# Patient Record
Sex: MALE | Race: WHITE | NOT HISPANIC OR LATINO | Employment: OTHER | URBAN - METROPOLITAN AREA
[De-identification: names, ages, dates, MRNs, and addresses within clinical notes are randomized per-mention and may not be internally consistent; named-entity substitution may affect disease eponyms.]

---

## 2021-12-17 ENCOUNTER — HOSPITAL ENCOUNTER (OUTPATIENT)
Facility: MEDICAL CENTER | Age: 61
End: 2021-12-17
Attending: NURSE PRACTITIONER
Payer: COMMERCIAL

## 2021-12-17 ENCOUNTER — OFFICE VISIT (OUTPATIENT)
Dept: URGENT CARE | Facility: CLINIC | Age: 61
End: 2021-12-17
Payer: COMMERCIAL

## 2021-12-17 VITALS
DIASTOLIC BLOOD PRESSURE: 78 MMHG | RESPIRATION RATE: 18 BRPM | OXYGEN SATURATION: 97 % | HEART RATE: 74 BPM | HEIGHT: 70 IN | SYSTOLIC BLOOD PRESSURE: 130 MMHG | TEMPERATURE: 98.7 F | BODY MASS INDEX: 22.9 KG/M2 | WEIGHT: 160 LBS

## 2021-12-17 DIAGNOSIS — R05.9 COUGH: ICD-10-CM

## 2021-12-17 DIAGNOSIS — J22 ACUTE LOWER RESPIRATORY TRACT INFECTION: ICD-10-CM

## 2021-12-17 PROCEDURE — U0005 INFEC AGEN DETEC AMPLI PROBE: HCPCS

## 2021-12-17 PROCEDURE — 99203 OFFICE O/P NEW LOW 30 MIN: CPT | Performed by: NURSE PRACTITIONER

## 2021-12-17 PROCEDURE — U0003 INFECTIOUS AGENT DETECTION BY NUCLEIC ACID (DNA OR RNA); SEVERE ACUTE RESPIRATORY SYNDROME CORONAVIRUS 2 (SARS-COV-2) (CORONAVIRUS DISEASE [COVID-19]), AMPLIFIED PROBE TECHNIQUE, MAKING USE OF HIGH THROUGHPUT TECHNOLOGIES AS DESCRIBED BY CMS-2020-01-R: HCPCS

## 2021-12-17 RX ORDER — LOSARTAN POTASSIUM 50 MG/1
1 TABLET ORAL
COMMUNITY
Start: 2021-11-23 | End: 2023-07-03

## 2021-12-17 RX ORDER — DOXYCYCLINE HYCLATE 100 MG/1
100 CAPSULE ORAL 2 TIMES DAILY
Qty: 20 CAPSULE | Refills: 0 | Status: SHIPPED | OUTPATIENT
Start: 2021-12-17 | End: 2021-12-27

## 2021-12-17 RX ORDER — LEVOTHYROXINE SODIUM 0.12 MG/1
TABLET ORAL
COMMUNITY
Start: 2021-11-08

## 2021-12-17 RX ORDER — FAMOTIDINE 20 MG/1
20 TABLET, FILM COATED ORAL
COMMUNITY

## 2021-12-17 RX ORDER — ATORVASTATIN CALCIUM 10 MG/1
TABLET, FILM COATED ORAL
COMMUNITY
Start: 2021-10-08 | End: 2021-12-17

## 2021-12-17 RX ORDER — ALBUTEROL SULFATE 90 UG/1
2 AEROSOL, METERED RESPIRATORY (INHALATION)
COMMUNITY

## 2021-12-17 ASSESSMENT — LIFESTYLE VARIABLES: SUBSTANCE_ABUSE: 0

## 2021-12-17 ASSESSMENT — ENCOUNTER SYMPTOMS
EYE REDNESS: 0
FEVER: 0
EYE DISCHARGE: 0
HEADACHES: 0
COUGH: 0
CHILLS: 0
NAUSEA: 0

## 2021-12-17 NOTE — PROGRESS NOTES
Luis Wilson is a 61 y.o. male who presents for Cough (x 5 days, thick phem, productive cough, headache.)      HPI this new problem.  Luis is a 61-year-old male patient presents with cough for 5 days.  He has been coughing up thick yellow phlegm.  Feels like the infection is deep in his lungs.  He denies fever or chills.  He has felt fatigued.  He has associated headache.  His son just came home from college for winter break and was ill.  His son was recently seen in urgent care and also treated for a respiratory infection.  Luis has been intermittently wheezing.  He takes his normal asthma medication.  He does not feel that this is like his usual asthma flareup.  No other aggravating alleviating factors.    Review of Systems   Constitutional: Negative for chills and fever.   Eyes: Negative for discharge and redness.   Respiratory: Negative for cough.    Cardiovascular: Negative for chest pain.   Gastrointestinal: Negative for nausea.   Neurological: Negative for headaches.   Endo/Heme/Allergies: Negative for environmental allergies.   Psychiatric/Behavioral: Negative for substance abuse.       Allergies:     No Known Allergies    PMSFS Hx:  History reviewed. No pertinent past medical history.  History reviewed. No pertinent surgical history.  History reviewed. No pertinent family history.  Social History     Tobacco Use   • Smoking status: Never Smoker   • Smokeless tobacco: Never Used   Substance Use Topics   • Alcohol use: Yes     Alcohol/week: 7.2 oz     Types: 12 Glasses of wine per week       Problems:   There is no problem list on file for this patient.      Medications:   Current Outpatient Medications on File Prior to Visit   Medication Sig Dispense Refill   • albuterol 108 (90 Base) MCG/ACT Aero Soln inhalation aerosol Inhale 2 Puffs.     • losartan (COZAAR) 50 MG Tab Take 1 Tablet by mouth every day.     • WIXELA INHUB 100-50 MCG/DOSE AEROSOL POWDER, BREATH ACTIVATED      • levothyroxine  "(SYNTHROID) 125 MCG Tab      • famotidine (PEPCID) 20 MG Tab Take 20 mg by mouth.       No current facility-administered medications on file prior to visit.          Objective:     /78   Pulse 74   Temp 37.1 °C (98.7 °F) (Temporal)   Resp 18   Ht 1.778 m (5' 10\")   Wt 72.6 kg (160 lb)   SpO2 97%   BMI 22.96 kg/m²     Physical Exam  Vitals reviewed.   Constitutional:       General: He is not in acute distress.     Appearance: Normal appearance. He is well-developed, well-groomed and normal weight. He is not ill-appearing or toxic-appearing.   HENT:      Head: Normocephalic.      Mouth/Throat:      Mouth: Mucous membranes are moist.   Cardiovascular:      Rate and Rhythm: Normal rate and regular rhythm.      Pulses: Normal pulses.      Heart sounds: Normal heart sounds.   Pulmonary:      Effort: Pulmonary effort is normal. No accessory muscle usage.      Breath sounds: No decreased air movement. Rhonchi present.   Skin:     General: Skin is warm.      Capillary Refill: Capillary refill takes less than 2 seconds.   Neurological:      Mental Status: He is alert and oriented to person, place, and time.   Psychiatric:         Mood and Affect: Mood normal.         Behavior: Behavior normal. Behavior is cooperative.         Thought Content: Thought content normal.         Assessment /Associated Orders:      1. Acute lower respiratory tract infection  doxycycline (VIBRAMYCIN) 100 MG Cap   2. Cough  SARS-CoV-2 PCR (24 hour In-House): Collect [facility] swab in [facility]       Medical Decision Making:    Pt is clinically stable at today's acute urgent care visit.  No acute distress noted. Appropriate for outpatient management at this time.   Acute problem today with uncertain prognosis.   Educated in proper administration of medication(s) ordered today including safety, possible SE, risks, benefits, rationale and alternatives to therapy.   Keep well hydrated  covid pcr- pending   Self quarantine per CDC guidelines. "         Advised to follow-up with the primary care provider for recheck, reevaluation, and consideration of further management if necessary.   Discussed management options (risks,benefits, and alternatives to treatment). Expressed understanding and the treatment plan was agreed upon. Questions were encouraged and answered   Return to urgent care prn if new or worsening sx or if there is no improvement in condition prn.    Educated in Red flags and indications to immediately call 911 or present to the Emergency Department.     I personally reviewed prior external notes and test results pertinent to today's visit.  I have independently reviewed and interpreted all diagnostics ordered during this urgent care acute visit.   Time spent evaluating this patient was at least 30 minutes and includes preparing for visit, counseling/education, exam and evaluation, obtaining history, independent interpretation, ordering lab/test/procedures,medication management and documentation.Time does not include separately billable procedures noted .

## 2021-12-18 DIAGNOSIS — R05.9 COUGH: ICD-10-CM

## 2021-12-18 LAB
COVID ORDER STATUS COVID19: NORMAL
SARS-COV-2 RNA RESP QL NAA+PROBE: NOTDETECTED
SPECIMEN SOURCE: NORMAL

## 2021-12-18 RX ORDER — BENZONATATE 200 MG/1
200 CAPSULE ORAL EVERY 8 HOURS PRN
Qty: 30 CAPSULE | Refills: 0 | Status: SHIPPED | OUTPATIENT
Start: 2021-12-18 | End: 2023-07-03

## 2021-12-18 NOTE — PROGRESS NOTES
Patient seen in urgent care yesterday.  Started on doxycycline for lower respiratory tract infection.  Patient comes in today stating that he had the impression cough medication would be sent.  Reports he already has an albuterol inhaler.  Prescription sent for Tessalon Perles to his pharmacy.

## 2023-05-23 ENCOUNTER — APPOINTMENT (OUTPATIENT)
Dept: ADMISSIONS | Facility: MEDICAL CENTER | Age: 63
End: 2023-05-23
Attending: UROLOGY
Payer: COMMERCIAL

## 2023-07-03 ENCOUNTER — PRE-ADMISSION TESTING (OUTPATIENT)
Dept: ADMISSIONS | Facility: MEDICAL CENTER | Age: 63
End: 2023-07-03
Attending: UROLOGY
Payer: COMMERCIAL

## 2023-07-03 VITALS — BODY MASS INDEX: 22.96 KG/M2 | HEIGHT: 70 IN

## 2023-07-03 RX ORDER — OLOPATADINE HYDROCHLORIDE 2 MG/ML
SOLUTION/ DROPS OPHTHALMIC
COMMUNITY
End: 2023-07-03

## 2023-07-03 RX ORDER — FLUTICASONE PROPIONATE AND SALMETEROL 250; 50 UG/1; UG/1
POWDER RESPIRATORY (INHALATION)
COMMUNITY
Start: 2023-06-06

## 2023-07-03 RX ORDER — AZELASTINE HYDROCHLORIDE 0.5 MG/ML
SOLUTION/ DROPS OPHTHALMIC
COMMUNITY
End: 2023-07-03

## 2023-07-03 RX ORDER — ATORVASTATIN CALCIUM 10 MG/1
10 TABLET, FILM COATED ORAL
COMMUNITY
Start: 2023-04-11

## 2023-07-03 RX ORDER — EPINEPHRINE 0.3 MG/.3ML
INJECTION SUBCUTANEOUS
COMMUNITY

## 2023-07-03 RX ORDER — MONTELUKAST SODIUM 10 MG/1
10 TABLET ORAL
COMMUNITY
Start: 2023-06-01

## 2023-07-03 RX ORDER — LOSARTAN POTASSIUM 100 MG/1
100 TABLET ORAL
COMMUNITY
Start: 2023-06-02

## 2023-07-03 NOTE — PREPROCEDURE INSTRUCTIONS
" Reviewed highlights of \"Preparing for your procedure\" over the phone. Sent email from pre-admit with \"Preparing for your Procedure\", \"fasting\", \"Pain management\", \"Patient Safety\", \"Speak-up\" handouts and map. Agreed with surgical site listed on consent.  Instructed to take respiratory medications, lipitor, thyroid meds DOS. Can take tylenol, pepcid if needed. Instructed to hold losartan for 24 hours.   "

## 2023-07-05 ENCOUNTER — APPOINTMENT (OUTPATIENT)
Dept: ADMISSIONS | Facility: MEDICAL CENTER | Age: 63
End: 2023-07-05
Attending: UROLOGY
Payer: COMMERCIAL

## 2023-07-05 DIAGNOSIS — Z01.810 PRE-OPERATIVE CARDIOVASCULAR EXAMINATION: ICD-10-CM

## 2023-07-05 LAB — EKG IMPRESSION: NORMAL

## 2023-07-05 PROCEDURE — 93010 ELECTROCARDIOGRAM REPORT: CPT | Performed by: INTERNAL MEDICINE

## 2023-07-05 PROCEDURE — 93005 ELECTROCARDIOGRAM TRACING: CPT

## 2023-07-13 ENCOUNTER — ANESTHESIA EVENT (OUTPATIENT)
Dept: SURGERY | Facility: MEDICAL CENTER | Age: 63
End: 2023-07-13
Payer: COMMERCIAL

## 2023-07-13 ENCOUNTER — HOSPITAL ENCOUNTER (OUTPATIENT)
Facility: MEDICAL CENTER | Age: 63
End: 2023-07-14
Attending: UROLOGY | Admitting: UROLOGY
Payer: COMMERCIAL

## 2023-07-13 ENCOUNTER — ANESTHESIA (OUTPATIENT)
Dept: SURGERY | Facility: MEDICAL CENTER | Age: 63
End: 2023-07-13
Payer: COMMERCIAL

## 2023-07-13 DIAGNOSIS — G89.18 POST-OP PAIN: ICD-10-CM

## 2023-07-13 PROBLEM — C61 PROSTATE CANCER (HCC): Status: ACTIVE | Noted: 2023-07-13

## 2023-07-13 LAB — PATHOLOGY CONSULT NOTE: NORMAL

## 2023-07-13 PROCEDURE — 160035 HCHG PACU - 1ST 60 MINS PHASE I: Performed by: UROLOGY

## 2023-07-13 PROCEDURE — 88305 TISSUE EXAM BY PATHOLOGIST: CPT | Mod: 59

## 2023-07-13 PROCEDURE — 502714 HCHG ROBOTIC SURGERY SERVICES: Performed by: UROLOGY

## 2023-07-13 PROCEDURE — 700101 HCHG RX REV CODE 250: Performed by: ANESTHESIOLOGY

## 2023-07-13 PROCEDURE — 700102 HCHG RX REV CODE 250 W/ 637 OVERRIDE(OP): Performed by: PHYSICIAN ASSISTANT

## 2023-07-13 PROCEDURE — 160048 HCHG OR STATISTICAL LEVEL 1-5: Performed by: UROLOGY

## 2023-07-13 PROCEDURE — A9270 NON-COVERED ITEM OR SERVICE: HCPCS | Performed by: PHYSICIAN ASSISTANT

## 2023-07-13 PROCEDURE — 00865 ANES XTRPRT LWR ABD PRST8ECT: CPT | Performed by: ANESTHESIOLOGY

## 2023-07-13 PROCEDURE — 160031 HCHG SURGERY MINUTES - 1ST 30 MINS LEVEL 5: Performed by: UROLOGY

## 2023-07-13 PROCEDURE — 700105 HCHG RX REV CODE 258: Mod: JZ | Performed by: UROLOGY

## 2023-07-13 PROCEDURE — 160042 HCHG SURGERY MINUTES - EA ADDL 1 MIN LEVEL 5: Performed by: UROLOGY

## 2023-07-13 PROCEDURE — 94760 N-INVAS EAR/PLS OXIMETRY 1: CPT

## 2023-07-13 PROCEDURE — 700111 HCHG RX REV CODE 636 W/ 250 OVERRIDE (IP): Performed by: ANESTHESIOLOGY

## 2023-07-13 PROCEDURE — 160009 HCHG ANES TIME/MIN: Performed by: UROLOGY

## 2023-07-13 PROCEDURE — 700101 HCHG RX REV CODE 250: Performed by: PHYSICIAN ASSISTANT

## 2023-07-13 PROCEDURE — 700101 HCHG RX REV CODE 250: Performed by: UROLOGY

## 2023-07-13 PROCEDURE — A9270 NON-COVERED ITEM OR SERVICE: HCPCS | Performed by: ANESTHESIOLOGY

## 2023-07-13 PROCEDURE — 160036 HCHG PACU - EA ADDL 30 MINS PHASE I: Performed by: UROLOGY

## 2023-07-13 PROCEDURE — 88309 TISSUE EXAM BY PATHOLOGIST: CPT

## 2023-07-13 PROCEDURE — G0378 HOSPITAL OBSERVATION PER HR: HCPCS

## 2023-07-13 PROCEDURE — 160002 HCHG RECOVERY MINUTES (STAT): Performed by: UROLOGY

## 2023-07-13 PROCEDURE — 700102 HCHG RX REV CODE 250 W/ 637 OVERRIDE(OP): Performed by: ANESTHESIOLOGY

## 2023-07-13 RX ORDER — FAMOTIDINE 20 MG/1
20 TABLET, FILM COATED ORAL DAILY
Status: DISCONTINUED | OUTPATIENT
Start: 2023-07-14 | End: 2023-07-14 | Stop reason: HOSPADM

## 2023-07-13 RX ORDER — DOCUSATE SODIUM 100 MG/1
100 CAPSULE, LIQUID FILLED ORAL 2 TIMES DAILY
Status: DISCONTINUED | OUTPATIENT
Start: 2023-07-13 | End: 2023-07-14 | Stop reason: HOSPADM

## 2023-07-13 RX ORDER — DEXAMETHASONE SODIUM PHOSPHATE 4 MG/ML
INJECTION, SOLUTION INTRA-ARTICULAR; INTRALESIONAL; INTRAMUSCULAR; INTRAVENOUS; SOFT TISSUE PRN
Status: DISCONTINUED | OUTPATIENT
Start: 2023-07-13 | End: 2023-07-13 | Stop reason: SURG

## 2023-07-13 RX ORDER — MEPERIDINE HYDROCHLORIDE 25 MG/ML
6.25 INJECTION INTRAMUSCULAR; INTRAVENOUS; SUBCUTANEOUS
Status: DISCONTINUED | OUTPATIENT
Start: 2023-07-13 | End: 2023-07-13 | Stop reason: HOSPADM

## 2023-07-13 RX ORDER — ACETAMINOPHEN 500 MG
1000 TABLET ORAL ONCE
Status: COMPLETED | OUTPATIENT
Start: 2023-07-13 | End: 2023-07-13

## 2023-07-13 RX ORDER — HALOPERIDOL 5 MG/ML
1 INJECTION INTRAMUSCULAR
Status: DISCONTINUED | OUTPATIENT
Start: 2023-07-13 | End: 2023-07-13 | Stop reason: HOSPADM

## 2023-07-13 RX ORDER — ACETAMINOPHEN 500 MG
1000 TABLET ORAL EVERY 6 HOURS
Status: DISCONTINUED | OUTPATIENT
Start: 2023-07-13 | End: 2023-07-14 | Stop reason: HOSPADM

## 2023-07-13 RX ORDER — SODIUM CHLORIDE, SODIUM LACTATE, POTASSIUM CHLORIDE, CALCIUM CHLORIDE 600; 310; 30; 20 MG/100ML; MG/100ML; MG/100ML; MG/100ML
INJECTION, SOLUTION INTRAVENOUS CONTINUOUS
Status: DISCONTINUED | OUTPATIENT
Start: 2023-07-13 | End: 2023-07-13 | Stop reason: HOSPADM

## 2023-07-13 RX ORDER — HYDRALAZINE HYDROCHLORIDE 20 MG/ML
5 INJECTION INTRAMUSCULAR; INTRAVENOUS
Status: DISCONTINUED | OUTPATIENT
Start: 2023-07-13 | End: 2023-07-13 | Stop reason: HOSPADM

## 2023-07-13 RX ORDER — OXYCODONE HYDROCHLORIDE AND ACETAMINOPHEN 5; 325 MG/1; MG/1
1 TABLET ORAL EVERY 4 HOURS PRN
Qty: 20 TABLET | Refills: 0 | Status: SHIPPED | OUTPATIENT
Start: 2023-07-13 | End: 2023-07-20

## 2023-07-13 RX ORDER — LEVOTHYROXINE SODIUM 0.12 MG/1
125 TABLET ORAL
Status: DISCONTINUED | OUTPATIENT
Start: 2023-07-14 | End: 2023-07-14 | Stop reason: HOSPADM

## 2023-07-13 RX ORDER — HYDROMORPHONE HYDROCHLORIDE 1 MG/ML
0.1 INJECTION, SOLUTION INTRAMUSCULAR; INTRAVENOUS; SUBCUTANEOUS
Status: DISCONTINUED | OUTPATIENT
Start: 2023-07-13 | End: 2023-07-13 | Stop reason: HOSPADM

## 2023-07-13 RX ORDER — BUPIVACAINE HYDROCHLORIDE AND EPINEPHRINE 5; 5 MG/ML; UG/ML
INJECTION, SOLUTION EPIDURAL; INTRACAUDAL; PERINEURAL
Status: DISCONTINUED | OUTPATIENT
Start: 2023-07-13 | End: 2023-07-13 | Stop reason: HOSPADM

## 2023-07-13 RX ORDER — ACETAMINOPHEN 500 MG
1000 TABLET ORAL EVERY 6 HOURS PRN
Status: DISCONTINUED | OUTPATIENT
Start: 2023-07-18 | End: 2023-07-14 | Stop reason: HOSPADM

## 2023-07-13 RX ORDER — MIDAZOLAM HYDROCHLORIDE 1 MG/ML
INJECTION INTRAMUSCULAR; INTRAVENOUS PRN
Status: DISCONTINUED | OUTPATIENT
Start: 2023-07-13 | End: 2023-07-13 | Stop reason: SURG

## 2023-07-13 RX ORDER — DEXTROSE MONOHYDRATE, SODIUM CHLORIDE, AND POTASSIUM CHLORIDE 50; 1.49; 4.5 G/1000ML; G/1000ML; G/1000ML
INJECTION, SOLUTION INTRAVENOUS CONTINUOUS
Status: DISPENSED | OUTPATIENT
Start: 2023-07-13 | End: 2023-07-13

## 2023-07-13 RX ORDER — METOCLOPRAMIDE HYDROCHLORIDE 5 MG/ML
INJECTION INTRAMUSCULAR; INTRAVENOUS PRN
Status: DISCONTINUED | OUTPATIENT
Start: 2023-07-13 | End: 2023-07-13 | Stop reason: SURG

## 2023-07-13 RX ORDER — ENOXAPARIN SODIUM 100 MG/ML
40 INJECTION SUBCUTANEOUS DAILY
Status: DISCONTINUED | OUTPATIENT
Start: 2023-07-14 | End: 2023-07-14 | Stop reason: HOSPADM

## 2023-07-13 RX ORDER — OXYCODONE HCL 5 MG/5 ML
5 SOLUTION, ORAL ORAL
Status: DISCONTINUED | OUTPATIENT
Start: 2023-07-13 | End: 2023-07-13 | Stop reason: HOSPADM

## 2023-07-13 RX ORDER — ONDANSETRON 2 MG/ML
4 INJECTION INTRAMUSCULAR; INTRAVENOUS EVERY 4 HOURS PRN
Status: DISCONTINUED | OUTPATIENT
Start: 2023-07-13 | End: 2023-07-14 | Stop reason: HOSPADM

## 2023-07-13 RX ORDER — OXYCODONE HYDROCHLORIDE 10 MG/1
10 TABLET ORAL
Status: DISCONTINUED | OUTPATIENT
Start: 2023-07-13 | End: 2023-07-14 | Stop reason: HOSPADM

## 2023-07-13 RX ORDER — LIDOCAINE HYDROCHLORIDE 20 MG/ML
INJECTION, SOLUTION EPIDURAL; INFILTRATION; INTRACAUDAL; PERINEURAL PRN
Status: DISCONTINUED | OUTPATIENT
Start: 2023-07-13 | End: 2023-07-13 | Stop reason: SURG

## 2023-07-13 RX ORDER — HYDROMORPHONE HYDROCHLORIDE 1 MG/ML
0.4 INJECTION, SOLUTION INTRAMUSCULAR; INTRAVENOUS; SUBCUTANEOUS
Status: DISCONTINUED | OUTPATIENT
Start: 2023-07-13 | End: 2023-07-13 | Stop reason: HOSPADM

## 2023-07-13 RX ORDER — DIPHENHYDRAMINE HYDROCHLORIDE 50 MG/ML
12.5 INJECTION INTRAMUSCULAR; INTRAVENOUS
Status: DISCONTINUED | OUTPATIENT
Start: 2023-07-13 | End: 2023-07-13 | Stop reason: HOSPADM

## 2023-07-13 RX ORDER — DIPHENHYDRAMINE HYDROCHLORIDE 50 MG/ML
25 INJECTION INTRAMUSCULAR; INTRAVENOUS EVERY 6 HOURS PRN
Status: DISCONTINUED | OUTPATIENT
Start: 2023-07-13 | End: 2023-07-14 | Stop reason: HOSPADM

## 2023-07-13 RX ORDER — LOSARTAN POTASSIUM 25 MG/1
100 TABLET ORAL DAILY
Status: DISCONTINUED | OUTPATIENT
Start: 2023-07-14 | End: 2023-07-13

## 2023-07-13 RX ORDER — MONTELUKAST SODIUM 10 MG/1
10 TABLET ORAL
Status: DISCONTINUED | OUTPATIENT
Start: 2023-07-13 | End: 2023-07-14 | Stop reason: HOSPADM

## 2023-07-13 RX ORDER — LOSARTAN POTASSIUM 50 MG/1
100 TABLET ORAL DAILY
Status: DISCONTINUED | OUTPATIENT
Start: 2023-07-14 | End: 2023-07-14 | Stop reason: HOSPADM

## 2023-07-13 RX ORDER — ROCURONIUM BROMIDE 10 MG/ML
INJECTION, SOLUTION INTRAVENOUS PRN
Status: DISCONTINUED | OUTPATIENT
Start: 2023-07-13 | End: 2023-07-13 | Stop reason: SURG

## 2023-07-13 RX ORDER — HYDROMORPHONE HYDROCHLORIDE 2 MG/ML
INJECTION, SOLUTION INTRAMUSCULAR; INTRAVENOUS; SUBCUTANEOUS PRN
Status: DISCONTINUED | OUTPATIENT
Start: 2023-07-13 | End: 2023-07-13 | Stop reason: SURG

## 2023-07-13 RX ORDER — HYDROMORPHONE HYDROCHLORIDE 1 MG/ML
0.5 INJECTION, SOLUTION INTRAMUSCULAR; INTRAVENOUS; SUBCUTANEOUS
Status: DISCONTINUED | OUTPATIENT
Start: 2023-07-13 | End: 2023-07-14 | Stop reason: HOSPADM

## 2023-07-13 RX ORDER — OXYCODONE HCL 5 MG/5 ML
10 SOLUTION, ORAL ORAL
Status: DISCONTINUED | OUTPATIENT
Start: 2023-07-13 | End: 2023-07-13 | Stop reason: HOSPADM

## 2023-07-13 RX ORDER — OXYCODONE HYDROCHLORIDE 5 MG/1
5 TABLET ORAL
Status: DISCONTINUED | OUTPATIENT
Start: 2023-07-13 | End: 2023-07-14 | Stop reason: HOSPADM

## 2023-07-13 RX ORDER — CEFAZOLIN SODIUM 1 G/3ML
INJECTION, POWDER, FOR SOLUTION INTRAMUSCULAR; INTRAVENOUS PRN
Status: DISCONTINUED | OUTPATIENT
Start: 2023-07-13 | End: 2023-07-13 | Stop reason: SURG

## 2023-07-13 RX ORDER — ONDANSETRON 2 MG/ML
4 INJECTION INTRAMUSCULAR; INTRAVENOUS
Status: DISCONTINUED | OUTPATIENT
Start: 2023-07-13 | End: 2023-07-13 | Stop reason: HOSPADM

## 2023-07-13 RX ORDER — GABAPENTIN 300 MG/1
300 CAPSULE ORAL ONCE
Status: COMPLETED | OUTPATIENT
Start: 2023-07-13 | End: 2023-07-13

## 2023-07-13 RX ORDER — EPHEDRINE SULFATE 50 MG/ML
5 INJECTION, SOLUTION INTRAVENOUS
Status: DISCONTINUED | OUTPATIENT
Start: 2023-07-13 | End: 2023-07-13 | Stop reason: HOSPADM

## 2023-07-13 RX ORDER — ATORVASTATIN CALCIUM 10 MG/1
10 TABLET, FILM COATED ORAL DAILY
Status: DISCONTINUED | OUTPATIENT
Start: 2023-07-14 | End: 2023-07-14 | Stop reason: HOSPADM

## 2023-07-13 RX ORDER — SODIUM CHLORIDE, SODIUM LACTATE, POTASSIUM CHLORIDE, CALCIUM CHLORIDE 600; 310; 30; 20 MG/100ML; MG/100ML; MG/100ML; MG/100ML
INJECTION, SOLUTION INTRAVENOUS CONTINUOUS
Status: ACTIVE | OUTPATIENT
Start: 2023-07-13 | End: 2023-07-13

## 2023-07-13 RX ORDER — HYDROMORPHONE HYDROCHLORIDE 1 MG/ML
0.2 INJECTION, SOLUTION INTRAMUSCULAR; INTRAVENOUS; SUBCUTANEOUS
Status: DISCONTINUED | OUTPATIENT
Start: 2023-07-13 | End: 2023-07-13 | Stop reason: HOSPADM

## 2023-07-13 RX ADMIN — ROCURONIUM BROMIDE 10 MG: 50 INJECTION, SOLUTION INTRAVENOUS at 10:41

## 2023-07-13 RX ADMIN — HYDROMORPHONE HYDROCHLORIDE 0.3 MG: 2 INJECTION INTRAMUSCULAR; INTRAVENOUS; SUBCUTANEOUS at 09:26

## 2023-07-13 RX ADMIN — HYDROMORPHONE HYDROCHLORIDE 0.3 MG: 2 INJECTION INTRAMUSCULAR; INTRAVENOUS; SUBCUTANEOUS at 10:55

## 2023-07-13 RX ADMIN — DOCUSATE SODIUM 100 MG: 100 CAPSULE, LIQUID FILLED ORAL at 17:28

## 2023-07-13 RX ADMIN — SODIUM CHLORIDE, POTASSIUM CHLORIDE, SODIUM LACTATE AND CALCIUM CHLORIDE: 600; 310; 30; 20 INJECTION, SOLUTION INTRAVENOUS at 06:46

## 2023-07-13 RX ADMIN — DEXAMETHASONE SODIUM PHOSPHATE 8 MG: 4 INJECTION INTRA-ARTICULAR; INTRALESIONAL; INTRAMUSCULAR; INTRAVENOUS; SOFT TISSUE at 10:18

## 2023-07-13 RX ADMIN — ROCURONIUM BROMIDE 40 MG: 50 INJECTION, SOLUTION INTRAVENOUS at 07:38

## 2023-07-13 RX ADMIN — MIDAZOLAM 2 MG: 1 INJECTION, SOLUTION INTRAMUSCULAR; INTRAVENOUS at 07:36

## 2023-07-13 RX ADMIN — OXYCODONE 5 MG: 5 TABLET ORAL at 16:09

## 2023-07-13 RX ADMIN — FENTANYL CITRATE 50 MCG: 50 INJECTION, SOLUTION INTRAMUSCULAR; INTRAVENOUS at 08:06

## 2023-07-13 RX ADMIN — ROCURONIUM BROMIDE 10 MG: 50 INJECTION, SOLUTION INTRAVENOUS at 08:52

## 2023-07-13 RX ADMIN — CEFAZOLIN 2 G: 1 INJECTION, POWDER, FOR SOLUTION INTRAMUSCULAR; INTRAVENOUS at 11:49

## 2023-07-13 RX ADMIN — CEFAZOLIN 2 G: 1 INJECTION, POWDER, FOR SOLUTION INTRAMUSCULAR; INTRAVENOUS at 07:58

## 2023-07-13 RX ADMIN — GABAPENTIN 300 MG: 300 CAPSULE ORAL at 06:40

## 2023-07-13 RX ADMIN — PROPOFOL 10 MG: 10 INJECTION, EMULSION INTRAVENOUS at 11:42

## 2023-07-13 RX ADMIN — POTASSIUM CHLORIDE, DEXTROSE MONOHYDRATE AND SODIUM CHLORIDE: 150; 5; 450 INJECTION, SOLUTION INTRAVENOUS at 16:09

## 2023-07-13 RX ADMIN — FENTANYL CITRATE 50 MCG: 50 INJECTION, SOLUTION INTRAMUSCULAR; INTRAVENOUS at 09:38

## 2023-07-13 RX ADMIN — SODIUM CHLORIDE, POTASSIUM CHLORIDE, SODIUM LACTATE AND CALCIUM CHLORIDE: 600; 310; 30; 20 INJECTION, SOLUTION INTRAVENOUS at 10:00

## 2023-07-13 RX ADMIN — LIDOCAINE HYDROCHLORIDE 50 MG: 20 INJECTION, SOLUTION EPIDURAL; INFILTRATION; INTRACAUDAL at 12:02

## 2023-07-13 RX ADMIN — ROCURONIUM BROMIDE 20 MG: 50 INJECTION, SOLUTION INTRAVENOUS at 09:20

## 2023-07-13 RX ADMIN — METOCLOPRAMIDE 10 MG: 5 INJECTION, SOLUTION INTRAMUSCULAR; INTRAVENOUS at 10:40

## 2023-07-13 RX ADMIN — ACETAMINOPHEN 1000 MG: 500 TABLET ORAL at 17:27

## 2023-07-13 RX ADMIN — ROCURONIUM BROMIDE 10 MG: 50 INJECTION, SOLUTION INTRAVENOUS at 09:56

## 2023-07-13 RX ADMIN — ROCURONIUM BROMIDE 20 MG: 50 INJECTION, SOLUTION INTRAVENOUS at 08:06

## 2023-07-13 RX ADMIN — ROCURONIUM BROMIDE 10 MG: 50 INJECTION, SOLUTION INTRAVENOUS at 10:02

## 2023-07-13 RX ADMIN — HYDROMORPHONE HYDROCHLORIDE 0.4 MG: 2 INJECTION INTRAMUSCULAR; INTRAVENOUS; SUBCUTANEOUS at 09:00

## 2023-07-13 RX ADMIN — ROCURONIUM BROMIDE 15 MG: 50 INJECTION, SOLUTION INTRAVENOUS at 11:42

## 2023-07-13 RX ADMIN — PROPOFOL 10 MG: 10 INJECTION, EMULSION INTRAVENOUS at 12:02

## 2023-07-13 RX ADMIN — ACETAMINOPHEN 1000 MG: 500 TABLET ORAL at 23:58

## 2023-07-13 RX ADMIN — HYDROMORPHONE HYDROCHLORIDE 0.4 MG: 2 INJECTION INTRAMUSCULAR; INTRAVENOUS; SUBCUTANEOUS at 11:53

## 2023-07-13 RX ADMIN — HYDRALAZINE HYDROCHLORIDE 5 MG: 20 INJECTION, SOLUTION INTRAMUSCULAR; INTRAVENOUS at 12:57

## 2023-07-13 RX ADMIN — LIDOCAINE HYDROCHLORIDE 50 MG: 20 INJECTION, SOLUTION EPIDURAL; INFILTRATION; INTRACAUDAL at 07:38

## 2023-07-13 RX ADMIN — MONTELUKAST 10 MG: 10 TABLET, FILM COATED ORAL at 22:26

## 2023-07-13 RX ADMIN — SUGAMMADEX 150 MG: 100 INJECTION, SOLUTION INTRAVENOUS at 12:16

## 2023-07-13 RX ADMIN — ACETAMINOPHEN 1000 MG: 500 TABLET ORAL at 06:40

## 2023-07-13 RX ADMIN — HYDROMORPHONE HYDROCHLORIDE 0.6 MG: 2 INJECTION INTRAMUSCULAR; INTRAVENOUS; SUBCUTANEOUS at 08:14

## 2023-07-13 RX ADMIN — PROPOFOL 140 MG: 10 INJECTION, EMULSION INTRAVENOUS at 07:38

## 2023-07-13 RX ADMIN — PROPOFOL 20 MG: 10 INJECTION, EMULSION INTRAVENOUS at 11:54

## 2023-07-13 ASSESSMENT — PAIN DESCRIPTION - PAIN TYPE
TYPE: SURGICAL PAIN

## 2023-07-13 ASSESSMENT — LIFESTYLE VARIABLES
EVER FELT BAD OR GUILTY ABOUT YOUR DRINKING: NO
EVER HAD A DRINK FIRST THING IN THE MORNING TO STEADY YOUR NERVES TO GET RID OF A HANGOVER: NO
ON A TYPICAL DAY WHEN YOU DRINK ALCOHOL HOW MANY DRINKS DO YOU HAVE: 1
TOTAL SCORE: 0
HAVE PEOPLE ANNOYED YOU BY CRITICIZING YOUR DRINKING: NO
ALCOHOL_USE: YES
HOW MANY TIMES IN THE PAST YEAR HAVE YOU HAD 5 OR MORE DRINKS IN A DAY: 0
TOTAL SCORE: 0
AVERAGE NUMBER OF DAYS PER WEEK YOU HAVE A DRINK CONTAINING ALCOHOL: 7
HAVE YOU EVER FELT YOU SHOULD CUT DOWN ON YOUR DRINKING: NO
TOTAL SCORE: 0
CONSUMPTION TOTAL: NEGATIVE

## 2023-07-13 ASSESSMENT — COGNITIVE AND FUNCTIONAL STATUS - GENERAL
SUGGESTED CMS G CODE MODIFIER DAILY ACTIVITY: CH
MOBILITY SCORE: 24
DAILY ACTIVITIY SCORE: 24
SUGGESTED CMS G CODE MODIFIER MOBILITY: CH

## 2023-07-13 ASSESSMENT — PATIENT HEALTH QUESTIONNAIRE - PHQ9
SUM OF ALL RESPONSES TO PHQ9 QUESTIONS 1 AND 2: 0
2. FEELING DOWN, DEPRESSED, IRRITABLE, OR HOPELESS: NOT AT ALL
SUM OF ALL RESPONSES TO PHQ9 QUESTIONS 1 AND 2: 0
1. LITTLE INTEREST OR PLEASURE IN DOING THINGS: NOT AT ALL
2. FEELING DOWN, DEPRESSED, IRRITABLE, OR HOPELESS: NOT AT ALL
1. LITTLE INTEREST OR PLEASURE IN DOING THINGS: NOT AT ALL

## 2023-07-13 NOTE — ANESTHESIA POSTPROCEDURE EVALUATION
Patient: Luis Wilson    Procedure Summary     Date: 07/13/23 Room / Location:  OR  / SURGERY [facility]    Anesthesia Start: 0733 Anesthesia Stop: 1224    Procedures:       RADICAL PROSTATECTOMY WITH BILATERAL PELVIC LYMPH NODE DISSECTION, ROBOT ASSISTED LAPAROSCOPIC (Abdomen)      LYMPHADENECTOMY, ROBOT-ASSISTED (Abdomen) Diagnosis: (CARCINOMA OF PROSTATE)    Surgeons: Marek Barrett M.D. Responsible Provider: Jorge Caicedo M.D.    Anesthesia Type: general ASA Status: 2          Final Anesthesia Type: general  Last vitals  BP   Blood Pressure: (!) 157/82    Temp   37.1 °C (98.7 °F)    Pulse   82   Resp   20    SpO2   94 %      Anesthesia Post Evaluation    Patient location during evaluation: PACU  Patient participation: complete - patient participated  Level of consciousness: awake and alert    Airway patency: patent  Anesthetic complications: no  Cardiovascular status: hemodynamically stable  Respiratory status: acceptable  Hydration status: euvolemic    PONV: none          No notable events documented.     Nurse Pain Score: 1 (NPRS)

## 2023-07-13 NOTE — DISCHARGE INSTRUCTIONS
DR. KING DISCHARGE INSTRUCTINS FOLLOWING   ROBOTIC RADICAL PROSTATECTOMY       DIET:  You can resume your regular diet. We encourage you to eat well-balanced and nutritious meals.      ACTIVITY:  Please restrain from strenuous activity or heavy lifting (more than 10 pounds) for the next 4 week(s). You should be able to resume light work after a week. Please walk daily as much as tolerated, making exercise a part of your daily life. Do not drive while using narcotics for pain control.     WOUND CARE:  1. For the next 3 to 4 weeks, please keep the incisions clean, dry and open to air when possible.  You have absorbable sutures that do not need to be removed.  You are advised not to pick, scratch or scrub the incisions.   2. Keep the dressing over the smaller incision (where the drain had been) for 24 hours and then remove this dressing. This incision will eventually close on its own.  Keep the incision clean, dry and open to air when possible.  3. Once the drain dressing is removed, light showers are fine as long as the incisions are dabbed dry and there is no prolonged water exposure.  Please avoid bathing or submersion of the wounds for 4 weeks.    CATHETER CARE:  Take care of your urethral catheter (“cottrell”) as you were taught in the hospital.  The purpose of the cottrell catheter is to drain your bladder while the operated area heals. Your cottrell will be removed at your follow-up appointment approximately 7-10 days following your surgery.    MEDICATIONS:  1. Please use tylenol and/or advil as need for pain control. Please use oxycodone as prescribed for pain refractory to these meds. Use stool softeners (miralax and colace) as prescribed to prevent constipation.  2. If you are using aspirin, Plavix, or coumadin, please don’t restart these medications until two days after your discharge if you are not having large amounts of blood in the urine.      FOLLOW-UP:  We will call you to schedule your follow up  appointment and cystogram with Dr. Barrett or his physician assistant in 7-10 days. If you have not heard from us in 1-2 business days, please call [phone] to schedule your follow-up appointment. You may also contact this number if you have questions or concerns that can be answered by Dr. Barrett staff.      WARNING SIGNS:  Problems with cottrell catheter, Fever greater than 101 degrees F, chest pain, shortness of breath, chills, nausea or vomiting, Large amount of clots in urine that make it difficult  for urine to drain from cottrell, increasing pain, or abdominal swelling. If you are experiencing these symptoms, call the Urology Clinic or go to emergency room.    It is normal to see blood in your urine for up to 2 weeks even from surgery. The urine may clear up entirely, and then turn bloody again a few days later depending on your activity level; do not be alarmed. However, if you experience severe pain or tenderness, have a lot of increased bleeding, or find that you are unable to urinate because of large clots, please notify your doctor immediately            Moni Quintana PA-C  [address] [place] [address]   [phone]

## 2023-07-13 NOTE — OR NURSING
1222: Patient arrived from OR via gurney.  Abdominal x6 lap sites, dermabond, CDI; Cold pack applied to abdomen. Urinary indwelling cath in place, bloody urine in collection bag.     Plan to keep pt in PACU for full hour per STOPBANG protocol.     Sedation/Resp Status:Eye opening to verbal.  Respirations spontaneous and non-labored.  Pt denies discomfort at this time. No nausea. Resting comfortably.    1230: Pt sleeping. VS stable, bloody urine in indwelling cath tubing present, NO clots present.     1245: Pt sleeping off/on, states he is feeling urge to urinate, reassured that cottrell cath in place. Output 100 ml of bloody urine.     1300: Pt is hypertensive, medicated for bp. Pt denies any discomfort and continues to sleep off/on. Tolerating sips of water. Wife updated of pt status.     1315: Pt's bp improved. Pt resting comfortably. No change in surgical sites assessment.     1330: Stop Bang Protocol complete. Pt meets criteria for transfer to unit. Pt dressed into gown for comfort. Report to KATHY Campos.     1345: Awaiting transport. Urine in collection bag is bloody, NO clots, output 100ml. L EJ IV removed per Dr. Caicedo.     1400: Pt resting and talkative. Pt eating Ice chips. Denies discomfort.     1415: No change.     1437: Transport taking pt to unit.

## 2023-07-13 NOTE — ANESTHESIA PROCEDURE NOTES
Peripheral IV    Date/Time: 7/13/2023 7:55 AM    Performed by: Jorge Caicedo M.D.  Authorized by: Jorge Caicedo M.D.    Size:  18 G  Laterality:  Left  Peripheral IV Location:  External jugular  Site Prep:  Alcohol  Technique:  Direct puncture  Attempts:  1

## 2023-07-13 NOTE — ANESTHESIA PROCEDURE NOTES
Airway    Date/Time: 7/13/2023 7:40 AM    Performed by: Jorge Caicedo M.D.  Authorized by: Jorge Caicedo M.D.    Location:  OR  Urgency:  Elective  Indications for Airway Management:  Anesthesia      Spontaneous Ventilation: absent    Sedation Level:  Deep  Preoxygenated: Yes    Patient Position:  Sniffing  Final Airway Type:  Endotracheal airway  Final Endotracheal Airway:  ETT  Cuffed: Yes    Technique Used for Successful ETT Placement:  Direct laryngoscopy  Devices/Methods Used in Placement:  Intubating stylet    Insertion Site:  Oral  Blade Type:  Souza  Laryngoscope Blade/Videolaryngoscope Blade Size:  2  ETT Size (mm):  8.0  Measured from:  Teeth  ETT to Teeth (cm):  22  Placement Verified by: auscultation and capnometry    Cormack-Lehane Classification:  Grade IIa - partial view of glottis  Number of Attempts at Approach:  1

## 2023-07-13 NOTE — PROGRESS NOTES
1555 patient awake. Family on bedside. patient in pain, reported to the nurse.     1652 patient awake, finishing up his lunch. Family on bedside.

## 2023-07-13 NOTE — OP REPORT
OPERATIVE NOTE:      Date: 7/13/2023    Patient ID:   Name:             Luis Wilson   YOB: 1960  Age:                 62 y.o.  male   MRN:               [id number]                                                                         PRE-OP DIAGNOSIS: [place] 3+4 T2a prostate cancer        POST-OP DIAGNOSIS: same            PROCEDURE:   Robotic assisted radical prostatectomy  Lysis of adhesions   SURGEON: Surgeon(s) and Role:     * Marek Barrett M.D. - Primary     ASSIST: CRUZ Hicks            ANESTHESIA: get            ESTIMATED BLOOD LOSS: 200            DRAINS: cottrell                  SPECIMENS: prostate               COMPLICATIONS: none                   CONDITION: stable            TECHNIQUE: TECHNIQUE:        TECHNIQUE:   Patient is brought to the operating room.  He is given a general anesthetic.  Arms are tucked at his side.  Legs are dropped about 10 degrees off horizontal.  He is then placed in steep Trendelenburg position at 27 degrees.  Shoulder supports have been placed.  Examination shows no pressure points.  Abdomen is prepped and draped in a sterile fashion.  Cottrell catheter was placed.  Abdomen is insufflated with the Veress needle to 15 mmHg pressure.  Our planned trocar sites are mapped out.  Cameral port is placed above the umbilicus.  With zero degress lens in place the other trocars are placed under direct vision. Robot is brought to the table in the side docking fashion.  The camera port is docked.  Localization is performed.  The other arms are docked.  Under vision the instruments are placed.  I took my seat at the console.  Adhesions of the sigmoid colon are taken down.  Peritoneum overlying the course of the seminal vesicles and ejaculatory duct is divided.  The seminal vesicles and ejaculatory ducts on both sides are identified.  Right ejaculatory duct is divided.  Vessels coming into the seminal vesicle are secured with hemoclips and divided.   Similar dissection is performed on the left side.      Going anteriorly the retropubic space is developed by incising the peritoneum on either side of the median umbilical ligaments and dividing the urachus across the midline.  He is found to have a large hernia mesh on the left which is adherent to the bladder. The adhesions of the mesh to the bladder are lysed to be able to mobilize and expose the bladder and prostate. This takes 30 minutes to accomplish.   Fatty tissue anterior to the prostate and bladder neck is removed.  30 degree downward lens is placed.  The anterior support structures including puboprostatic ligaments and endopelvic fascia are left undisturbed to remain to continue to support the urethra to assist return of post-opertative urinary control .  Bladder neck dissection is then performed beginning in the midline.  Bladder neck muscle fibers are spared and dissection is taken to where the urethra is seen entering the prostate. The anterior urethra is then divided to expose the catheter. It is pulled to the anterior abdominal wall by a suture.  Posterior bladder neck is divided through full-thickness.  Then continuing a posterior dissection the bladder is released from the base of the prostate.  The longitudinal muscle fibers of the retrotrigonal layer are identified and divided to expose the posterior dissection space. Much of this layer is spared for later posterior reconstruction.   The seminal vesicles and ejaculatory then pulled up out of the incision and elevated towards the abdominal wall to expose the Denonvillier's fascia.      The fascia is divided between layers with dissection then carried to the apex of the prostate.  The medial pedicle at the base of the prostate on both sides is developed and secured with hemoclips and divided. With the 30 degree up lens the medial edges of the neurovascular bundles are identified and released from the undersurface of the prostate. Approaching the  right vascular pedicle first, the lateral prostatic fascia is opened high on the prostate. The helps develop the superior edge of the NVB and the main vascular pedicle coming in to the corner of the prostate. The pedicle is developed anterior to the course of the NVB. It is secured with hemolock and divided. The smaller branches are secured with hemoclips and divided.  The NVB is then able to be swept away from any remaining attachments to the prostate without stretch. This is take to the apex. Similar dissection was then performed on the left side.  At this point the prostate is attached by the dorsal venous complex and the apical urethra. The prostatic apex is exposed anteriorly as well as posterolaterally on both sides helping to release the dvc away from the apex. Then working on the right and left side we are able to mobilize the apex away from the distal aspect of the neurovascular bundle and the pelvic floor.  The dorsal complex is divided following the anterior aspect of the prostate working our way down to the urethra.  The urethra is further mobilized to maximize its length.  The anterior urethra was divided to expose the catheter but not circumferentially around at this point.  The dorsal complex was then secured with a running barbed suture to complete hemostasis.  The anterior urethra is divided distal to the prostate apece. A 2-0 Vicryl sutures in place the 6 o'clock position of the urethra as the first anastomotic suture.  Posterior urethra was then divided to release the prostate.  The prostate was then placed in a specimen sac and into the upper abdomen.  Examination of the neurovascular bundles and pedicle showed no arterial bleeding. Small venous sinuses are carefully oversewn to complete hemostasis.     Posterior reconstruction is performed by attaching the divided retrotrigonal layer to the rectourethralis fascia identified dorsal to the posterior urethra. This brings the bladder to the deep  pelvis.  The previous placed 2-0 Vicryl and the urethra was attached to the bladder at 6 o'clock position and tied down. 2 more interrupted ties are placed to make a secure posterior attachment. 3-0 startifix sutures placed on the right and left side complete the anastomosis.  Fresh catheter was placed and irrigated to 100cc with no leak at the anastomosis.  The abdomen is desufflated to 0mm HG pressure with no bleeding of the dvc.    I returned to the OR table.  Trocars removed under direct vision.  Camera port is opened for another centimeter. The  specimen is retrieved.  This fascia is closed with running 0 Vicryl suture.  The skin incisions are closed with 4-0 Monocryl.  This completes the procedure.  There were no complications.  Estimated blood loss is 200 cc.  He is sent to recovery in stable condition.

## 2023-07-13 NOTE — ANESTHESIA TIME REPORT
Anesthesia Start and Stop Event Times     Date Time Event    7/13/2023 0730 Ready for Procedure     0733 Anesthesia Start     1224 Anesthesia Stop        Responsible Staff  07/13/23    Name Role Begin End    Jorge Caicedo M.D. Anesth 0733 1224        Overtime Reason:  no overtime (within assigned shift)    Comments:

## 2023-07-13 NOTE — ANESTHESIA PREPROCEDURE EVALUATION
Case: [id number] Date/Time: 07/13/23 0715    Procedures:       RADICAL PROSTATECTOMY WITH BILATERAL PELVIC LYMPH NODE DISSECTION, ROBOT ASSISTED LAPAROSCOPIC      LYMPHADENECTOMY, ROBOT-ASSISTED    Pre-op diagnosis: CARCINOMA OF PROSTATE    Location: [facility] OR 01 / SURGERY [facility]    Surgeons: Marek Barrett M.D.      asthma  Htn  Prostate ca    Relevant Problems   No relevant active problems       Physical Exam    Airway   Mallampati: II  TM distance: >3 FB  Neck ROM: full       Cardiovascular - normal exam  Rhythm: regular  Rate: normal  (-) murmur     Dental - normal exam        Facial Hair   Pulmonary - normal exam  Breath sounds clear to auscultation     Abdominal    Neurological - normal exam                 Anesthesia Plan    ASA 2       Plan - general       Airway plan will be ETT          Induction: intravenous    Postoperative Plan: Postoperative administration of opioids is intended.    Pertinent diagnostic labs and testing reviewed    Informed Consent:    Anesthetic plan and risks discussed with patient.    Use of blood products discussed with: patient whom consented to blood products.

## 2023-07-14 VITALS
OXYGEN SATURATION: 100 % | TEMPERATURE: 98.1 F | HEART RATE: 85 BPM | SYSTOLIC BLOOD PRESSURE: 128 MMHG | WEIGHT: 148.37 LBS | HEIGHT: 70 IN | BODY MASS INDEX: 21.24 KG/M2 | RESPIRATION RATE: 18 BRPM | DIASTOLIC BLOOD PRESSURE: 62 MMHG

## 2023-07-14 LAB
ANION GAP SERPL CALC-SCNC: 9 MMOL/L (ref 7–16)
BUN SERPL-MCNC: 11 MG/DL (ref 8–22)
CALCIUM SERPL-MCNC: 8.7 MG/DL (ref 8.4–10.2)
CHLORIDE SERPL-SCNC: 106 MMOL/L (ref 96–112)
CO2 SERPL-SCNC: 23 MMOL/L (ref 20–33)
CREAT SERPL-MCNC: 0.7 MG/DL (ref 0.5–1.4)
ERYTHROCYTE [DISTWIDTH] IN BLOOD BY AUTOMATED COUNT: 42.4 FL (ref 35.9–50)
GFR SERPLBLD CREATININE-BSD FMLA CKD-EPI: 104 ML/MIN/1.73 M 2
GLUCOSE SERPL-MCNC: 151 MG/DL (ref 65–99)
HCT VFR BLD AUTO: 38.8 % (ref 42–52)
HGB BLD-MCNC: 13.5 G/DL (ref 14–18)
MCH RBC QN AUTO: 32.7 PG (ref 27–33)
MCHC RBC AUTO-ENTMCNC: 34.8 G/DL (ref 32.3–36.5)
MCV RBC AUTO: 93.9 FL (ref 81.4–97.8)
PLATELET # BLD AUTO: 218 K/UL (ref 164–446)
PMV BLD AUTO: 9.7 FL (ref 9–12.9)
POTASSIUM SERPL-SCNC: 3.9 MMOL/L (ref 3.6–5.5)
RBC # BLD AUTO: 4.13 M/UL (ref 4.7–6.1)
SODIUM SERPL-SCNC: 138 MMOL/L (ref 135–145)
WBC # BLD AUTO: 9.6 K/UL (ref 4.8–10.8)

## 2023-07-14 PROCEDURE — A9270 NON-COVERED ITEM OR SERVICE: HCPCS | Performed by: UROLOGY

## 2023-07-14 PROCEDURE — 80048 BASIC METABOLIC PNL TOTAL CA: CPT

## 2023-07-14 PROCEDURE — 700102 HCHG RX REV CODE 250 W/ 637 OVERRIDE(OP): Performed by: UROLOGY

## 2023-07-14 PROCEDURE — 94760 N-INVAS EAR/PLS OXIMETRY 1: CPT

## 2023-07-14 PROCEDURE — 85027 COMPLETE CBC AUTOMATED: CPT

## 2023-07-14 PROCEDURE — 700102 HCHG RX REV CODE 250 W/ 637 OVERRIDE(OP): Performed by: PHYSICIAN ASSISTANT

## 2023-07-14 PROCEDURE — 36415 COLL VENOUS BLD VENIPUNCTURE: CPT

## 2023-07-14 PROCEDURE — A9270 NON-COVERED ITEM OR SERVICE: HCPCS | Performed by: PHYSICIAN ASSISTANT

## 2023-07-14 PROCEDURE — G0378 HOSPITAL OBSERVATION PER HR: HCPCS

## 2023-07-14 RX ADMIN — ATORVASTATIN CALCIUM 10 MG: 10 TABLET, FILM COATED ORAL at 05:22

## 2023-07-14 RX ADMIN — LOSARTAN POTASSIUM 100 MG: 50 TABLET, FILM COATED ORAL at 05:21

## 2023-07-14 RX ADMIN — ACETAMINOPHEN 1000 MG: 500 TABLET ORAL at 11:03

## 2023-07-14 RX ADMIN — FAMOTIDINE 20 MG: 20 TABLET ORAL at 05:22

## 2023-07-14 RX ADMIN — DOCUSATE SODIUM 100 MG: 100 CAPSULE, LIQUID FILLED ORAL at 05:21

## 2023-07-14 RX ADMIN — ACETAMINOPHEN 1000 MG: 500 TABLET ORAL at 05:21

## 2023-07-14 RX ADMIN — OXYCODONE 5 MG: 5 TABLET ORAL at 11:03

## 2023-07-14 RX ADMIN — LEVOTHYROXINE SODIUM 125 MCG: 0.12 TABLET ORAL at 05:21

## 2023-07-14 ASSESSMENT — PAIN DESCRIPTION - PAIN TYPE: TYPE: SURGICAL PAIN

## 2023-07-14 NOTE — DISCHARGE SUMMARY
Discharge Summary    CHIEF COMPLAINT ON ADMISSION  Prostate cancer      Reason for Admission  Prostate cancer (HCC)     Admission Date  7/13/2023    CODE STATUS  Full Code    HPI & HOSPITAL COURSE  This is a 62 y.o. male here with prostate cancer that is now s/p robotic assisted laparoscopic prostatectomy 07/14/2023.  He has done well following the procedure. Reports pain is well controlled. Output via the cottrell catheter has drastically light, now clear. Tolerating diet. + flatus. At this point he is stable for discharge and will discharge with a close outpatient post op for management of the cottrell catheter.    Therefore, he is discharged in good and stable condition to home with close outpatient follow-up.    The patient recovered much more quickly than anticipated on admission.    Discharge Date  07/14    FOLLOW UP ITEMS POST DISCHARGE  Post op with [facility] [place]    DISCHARGE DIAGNOSES  Principal Problem:    Prostate cancer (HCC) (POA: Yes)  Resolved Problems:    * No resolved hospital problems. *      FOLLOW UP  Future Appointments   Date Time Provider Department Center   7/20/2023  1:30 PM [address]     No follow-up provider specified.    MEDICATIONS ON DISCHARGE     Medication List        START taking these medications        Instructions   oxyCODONE-acetaminophen 5-325 MG Tabs  Commonly known as: Percocet   Take 1 Tablet by mouth every four hours as needed for Moderate Pain for up to 7 days.  Dose: 1 Tablet            CONTINUE taking these medications        Instructions   albuterol 108 (90 Base) MCG/ACT Aers inhalation aerosol   Inhale 2 Puffs.  Dose: 2 Puff     atorvastatin 10 MG Tabs  Commonly known as: Lipitor   Take 10 mg by mouth every day.  Dose: 10 mg     EPINEPHrine 0.3 MG/0.3ML Soaj solution for injection  Commonly known as: Epipen      famotidine 20 MG Tabs  Commonly known as: Pepcid   Take 20 mg by mouth.  Dose: 20 mg     levothyroxine 125 MCG Tabs  Commonly known as:  Synthroid      losartan 100 MG Tabs  Commonly known as: Cozaar   Take 100 mg by mouth every day.  Dose: 100 mg     montelukast 10 MG Tabs  Commonly known as: Singulair   Take 10 mg by mouth at bedtime.  Dose: 10 mg     MULTI-VITAMIN DAILY PO   Take  by mouth.     TYLENOL PO   Take  by mouth.     VITAMIN D (CHOLECALCIFEROL) PO   Take  by mouth.     * Wixela Inhub 100-50 MCG/ACT Aepb  Generic drug: fluticasone-salmeterol      * Wixela Inhub 250-50 MCG/ACT Aepb  Generic drug: fluticasone-salmeterol   INHALE 1 PUFF INTO THE LUNGS EVERY 12 HOURS. SAME AS ADVAIR           * This list has 2 medication(s) that are the same as other medications prescribed for you. Read the directions carefully, and ask your doctor or other care provider to review them with you.                  Allergies  Allergies   Allergen Reactions    Cori-Kit Bee Sting Rash     Local swelling    Shellfish-Derived Products Rash     Rash       DIET  Orders Placed This Encounter   Procedures    Diet Order Diet: Full Liquid     Standing Status:   Standing     Number of Occurrences:   1     Order Specific Question:   Diet:     Answer:   Full Liquid [11]       ACTIVITY  Light duty.  10-lb lifting restriction    CONSULTATIONS  none    PROCEDURES  s/p robotic assisted laparoscopic prostatectomy 07/14/2023    LABORATORY  Lab Results   Component Value Date    SODIUM 138 07/14/2023    POTASSIUM 3.9 07/14/2023    CHLORIDE 106 07/14/2023    CO2 23 07/14/2023    GLUCOSE 151 (H) 07/14/2023    BUN 11 07/14/2023    CREATININE 0.70 07/14/2023        Lab Results   Component Value Date    WBC 9.6 07/14/2023    HEMOGLOBIN 13.5 (L) 07/14/2023    HEMATOCRIT 38.8 (L) 07/14/2023    PLATELETCT 218 07/14/2023        Total time of the discharge process exceeds 35 minutes.

## 2023-07-14 NOTE — CARE PLAN
The patient is Stable - Low risk of patient condition declining or worsening    Shift Goals  Clinical Goals: Pt's pain will be tolerable at 2/10 at end of shift; monitor urine output.  Patient Goals: Sleep and rest.  Family Goals: na    Progress made toward(s) clinical / shift goals:      Pt's pain was tolerable at end of shift; pt's urine output via cottrell catheter was ___ ml overnight; pt ambulated couple of times to bathroom  SBA with FWW    Patient is not progressing towards the following goals:

## 2023-07-14 NOTE — PROGRESS NOTES
Received report from night shift RN. Pt is full code, A&O x4, full liquid diet, RA, self. Precautions put in place bed in the lowest position and call light within reach.    0700: Seen pt at bedside. Pt is awake watching TV pt has no other needs at the moment.     0800: Pt is sitting in bed comfortably eating breakfast and watching TV. No other needs at the moment.     0900: RN is in the room with pt going over discharge paperwork. No other needs at the moment.   0937: RN notified CNA that pt is ready to discharge just waiting for wife to arrive.     1000:Pt is sitting in bed comfortably. CNA took vitals. Pt still waiting for his wife to come to pick him up. No other needs at the moment.     1100:    1200:    1300:    1400:    1500:    1600:    1700:    1800:    1900:

## 2023-07-14 NOTE — PROGRESS NOTES
Bedside report received from night RN. Assumed care of patient. Daily plan of care discussed. Pt awake and alert, Brandt catheter in place draining clear yellow urine. Pt denies significant discomfort this AM, eager to discharge home today if cleared. 12 hour chart check complete. Hourly rounding in place.    0815 Hourly round complete. Pt aware of and agreeable to plan to discharge today.    0937 Hourly round complete. Overnight bag changed to leg bag. Discharge instructions reviewed and signed. Pt provided with extra supplies to take home. IV removed. Pt waiting for spouse for transport home.    1044 Hourly round complete. Pt awaiting ride. No needs identified.

## 2023-07-14 NOTE — PROGRESS NOTES
Pt discharged to home via personal vehicle with spouse. Discharge paperwork reviewed and signed. Prescribed home medications reviewed with pt per discharge summary. VSS. Pt escorted off unit via wheelchair with hospital staff.     Improved

## 2023-07-14 NOTE — PROGRESS NOTES
Received report from Yaa CHAVEZ. Pt resting in bed, awake. Pt A&O x4, on RA. Pt denies pain at this time. Pt updated with POC which includes pain mgt and ambulation. Call light within reach, fall precautions in place, all needs met at this time.

## 2023-07-14 NOTE — PROGRESS NOTES
Pt awake in bed watching tv no needs at this time    2145 pt awake watching tv vitals taken no needs at this time    2300 pt awake watching tv in bed no needs at this time    0120 pt sleeping with pulse ox on    0305 pt awake watching tv no needs at this time

## 2023-07-14 NOTE — CARE PLAN
Problem: Knowledge Deficit - Standard  Goal: Patient and family/care givers will demonstrate understanding of plan of care, disease process/condition, diagnostic tests and medications  Outcome: Progressing     Problem: Early Mobilization - Post Surgery  Goal: Early mobilization post surgery  Outcome: Progressing     Problem: Pain - Post Surgery  Goal: Alleviation or reduction of pain post surgery  Outcome: Progressing     The patient is Stable - Low risk of patient condition declining or worsening    Shift Goals  Clinical Goals: Monitor Brandt catheter output; ensure pain control; monitor surgical sites  Patient Goals: Discharge home today if cleared  Family Goals: n/a    Progress made toward(s) clinical / shift goals:  Pt cleared for discharge by Urology. Catheter care instructions given. Pt verbalized understanding.    Patient is not progressing towards the following goals: n/a

## 2023-07-14 NOTE — PROGRESS NOTES
1910 - Received bedside patient report from KATHY Romero. Patient is awake, alert & oriented x4. Patient on room air. Patient resting in bed.  Patient educated on call light use for needs. Belongings within reach. Bed at lowest position. Safety precautions in place.      2030 - Patient resting in bed, chest rise observed, unlabored respirations. Oxygen saturation 94% on room air. Heart rate 72.     2115 - Report given to KATHY Mooney.    2145 - Vital signs recorded.    2226 - Medication administered per MAR.

## 2023-07-20 ENCOUNTER — HOSPITAL ENCOUNTER (OUTPATIENT)
Dept: RADIOLOGY | Facility: MEDICAL CENTER | Age: 63
End: 2023-07-20
Attending: UROLOGY
Payer: COMMERCIAL

## 2023-07-20 DIAGNOSIS — C61 MALIGNANT NEOPLASM OF PROSTATE (HCC): ICD-10-CM

## 2023-07-20 PROCEDURE — 51600 INJECTION FOR BLADDER X-RAY: CPT

## 2023-07-20 PROCEDURE — 700117 HCHG RX CONTRAST REV CODE 255: Performed by: UROLOGY

## 2023-07-20 RX ADMIN — IOHEXOL 75 ML: 240 INJECTION, SOLUTION INTRATHECAL; INTRAVASCULAR; INTRAVENOUS; ORAL at 13:30

## 2023-07-20 RX ADMIN — IOHEXOL 50 ML: 240 INJECTION, SOLUTION INTRATHECAL; INTRAVASCULAR; INTRAVENOUS; ORAL at 13:30

## 2024-05-18 ENCOUNTER — OFFICE VISIT (OUTPATIENT)
Dept: URGENT CARE | Facility: CLINIC | Age: 64
End: 2024-05-18
Payer: COMMERCIAL

## 2024-05-18 ENCOUNTER — APPOINTMENT (OUTPATIENT)
Dept: RADIOLOGY | Facility: IMAGING CENTER | Age: 64
End: 2024-05-18
Attending: PHYSICIAN ASSISTANT
Payer: COMMERCIAL

## 2024-05-18 VITALS
BODY MASS INDEX: 21.47 KG/M2 | WEIGHT: 150 LBS | HEART RATE: 72 BPM | DIASTOLIC BLOOD PRESSURE: 70 MMHG | OXYGEN SATURATION: 97 % | HEIGHT: 70 IN | RESPIRATION RATE: 18 BRPM | SYSTOLIC BLOOD PRESSURE: 126 MMHG | TEMPERATURE: 97.8 F

## 2024-05-18 DIAGNOSIS — R06.2 WHEEZING: ICD-10-CM

## 2024-05-18 DIAGNOSIS — U07.1 COVID-19 VIRUS INFECTION: ICD-10-CM

## 2024-05-18 PROCEDURE — 3078F DIAST BP <80 MM HG: CPT | Performed by: PHYSICIAN ASSISTANT

## 2024-05-18 PROCEDURE — 99213 OFFICE O/P EST LOW 20 MIN: CPT | Mod: 25 | Performed by: PHYSICIAN ASSISTANT

## 2024-05-18 PROCEDURE — 3074F SYST BP LT 130 MM HG: CPT | Performed by: PHYSICIAN ASSISTANT

## 2024-05-18 PROCEDURE — 94640 AIRWAY INHALATION TREATMENT: CPT | Performed by: PHYSICIAN ASSISTANT

## 2024-05-18 PROCEDURE — 71046 X-RAY EXAM CHEST 2 VIEWS: CPT | Mod: TC,FY | Performed by: PHYSICIAN ASSISTANT

## 2024-05-18 RX ORDER — CODEINE PHOSPHATE AND GUAIFENESIN 10; 100 MG/5ML; MG/5ML
5 SOLUTION ORAL EVERY 4 HOURS PRN
COMMUNITY

## 2024-05-18 RX ORDER — ALBUTEROL SULFATE 2.5 MG/3ML
2.5 SOLUTION RESPIRATORY (INHALATION) ONCE
Status: COMPLETED | OUTPATIENT
Start: 2024-05-18 | End: 2024-05-18

## 2024-05-18 RX ORDER — PREDNISONE 20 MG/1
TABLET ORAL
Qty: 7 TABLET | Refills: 0 | Status: SHIPPED | OUTPATIENT
Start: 2024-05-18

## 2024-05-18 RX ORDER — ACETAMINOPHEN 500 MG
1000 TABLET ORAL EVERY 6 HOURS PRN
COMMUNITY

## 2024-05-18 RX ORDER — ALBUTEROL SULFATE 0.63 MG/3ML
0.63 SOLUTION RESPIRATORY (INHALATION) EVERY 4 HOURS PRN
Qty: 90 ML | Refills: 1 | Status: SHIPPED | OUTPATIENT
Start: 2024-05-18

## 2024-05-18 RX ADMIN — ALBUTEROL SULFATE 2.5 MG: 2.5 SOLUTION RESPIRATORY (INHALATION) at 12:36

## 2024-05-18 ASSESSMENT — ENCOUNTER SYMPTOMS
MYALGIAS: 1
FEVER: 1
COUGH: 1
GASTROINTESTINAL NEGATIVE: 1
HEADACHES: 1
SORE THROAT: 1
CARDIOVASCULAR NEGATIVE: 1
PSYCHIATRIC NEGATIVE: 1
CHILLS: 1
EYES NEGATIVE: 1

## 2024-05-18 ASSESSMENT — FIBROSIS 4 INDEX: FIB4 SCORE: 1.37

## 2024-05-18 NOTE — PROGRESS NOTES
Chief Complaint   Patient presents with    Cough     X 5 days, did  Home Covid test: Positive, chest congestion, low energy, ears feel full/congested, sinus headache, had diarrhea, slight wheezing: asthmatic        HISTORY OF PRESENT ILLNESS: Patient is a 63 y.o. male who presents today because he had a home positive COVID test on Tuesday.  Patient states that he likely started feeling poorly last week and.  He describes a history of asthma and has needed steroid courses in the past.  He has been feeling wheezy and has been using a home nebulizer  albuterol.  He denies significant fever or chills.  He has been having general malaise and fatigue as well as sinus pressure and rhinorrhea and postnasal drip.  He denies purulent sputum or nasal discharge.  He states his wife does not have similar symptoms.  He has been vaccinated for COVID.    Patient Active Problem List    Diagnosis Date Noted    Prostate cancer (HCC) 2023       Allergies:Cori-kit bee sting and Shellfish-derived products    Current Outpatient Medications Ordered in Epic   Medication Sig Dispense Refill    guaifenesin-codeine (ROBITUSSIN AC) Solution oral solution Take 5 mL by mouth every four hours as needed for Cough.      acetaminophen (TYLENOL) 500 MG Tab Take 1,000 mg by mouth every 6 hours as needed.      Multiple Vitamin (MULTI-VITAMIN DAILY PO) Take  by mouth.      VITAMIN D, CHOLECALCIFEROL, PO Take  by mouth.      atorvastatin (LIPITOR) 10 MG Tab Take 10 mg by mouth every day.      EPINEPHrine (EPIPEN) 0.3 MG/0.3ML Solution Auto-injector solution for injection       losartan (COZAAR) 100 MG Tab Take 100 mg by mouth every day.      albuterol 108 (90 Base) MCG/ACT Aero Soln inhalation aerosol Inhale 2 Puffs.      famotidine (PEPCID) 20 MG Tab Take 20 mg by mouth.      WIXELA INHUB 100-50 MCG/DOSE AEROSOL POWDER, BREATH ACTIVATED       levothyroxine (SYNTHROID) 125 MCG Tab       montelukast (SINGULAIR) 10 MG Tab Take 10 mg by mouth at  "bedtime. (Patient not taking: Reported on 5/18/2024)       No current Psychiatric-ordered facility-administered medications on file.       Past Medical History:   Diagnosis Date    Asthma 1970    Controlled    Cancer (HCC) 2023    prostate    Disorder of thyroid 2010    Hypothyrodism    Heart burn 2020    High cholesterol 2020    Hypertension 2021       Social History     Tobacco Use    Smoking status: Never    Smokeless tobacco: Never   Vaping Use    Vaping status: Never Used   Substance Use Topics    Alcohol use: Yes     Alcohol/week: 7.2 oz     Types: 12 Glasses of wine per week     Comment: occ    Drug use: Never       Family Status   Relation Name Status    Curtis Metz (Not Specified)    Brandyn Wilson (Not Specified)     Family History   Problem Relation Age of Onset    Diabetes Mother         Type 2    Lung Disease Father         Copd       Review of Systems   Constitutional:  Positive for chills, fever and malaise/fatigue.   HENT:  Positive for congestion and sore throat.    Eyes: Negative.    Respiratory:  Positive for cough.    Cardiovascular: Negative.    Gastrointestinal: Negative.    Genitourinary: Negative.    Musculoskeletal:  Positive for joint pain and myalgias.   Skin: Negative.    Neurological:  Positive for headaches.   Endo/Heme/Allergies: Negative.    Psychiatric/Behavioral: Negative.     All other systems reviewed and are negative.       Exam:  /70 (BP Location: Left arm, Patient Position: Sitting, BP Cuff Size: Adult)   Pulse 72   Temp 36.6 °C (97.8 °F) (Temporal)   Resp 18   Ht 1.778 m (5' 10\")   Wt 68 kg (150 lb)   SpO2 97%     Physical Exam   Nursing note and Vitals Reviewed.    Constitutional:   Appropriately groomed, pleasant affect, well nourished, in NAD.    Head:   Normocephalic, atraumatic.    Eyes:   PERRLA, EOM's full, sclera white, conjunctiva not erythematous, and medial canthus without exudate bilaterally.    Ears:  Auricle and tragus non-tender to " manipulation.  No pre-auricular lymphadenopathy or mastoid ttp.  EACs with mild cerumen bilaterally, not erythematous.  TM’s pearly gray with cone of light present and umbo and malleolus visible bilaterally.  No bulging or fluid bubbles present in middle ear.  Hearing grossly intact to voice.    Nose:  Nares patent bilaterally.  Nasal mucosa edematous with rhinorrhea bilaterally. Sinuses not tender to percussion.    Throat:  Dentition wnl, mucosa moist without lesions.  Oropharynx erythematous, with edema of the palantine pillars bilaterally without exudates.    Mild post nasal drainage present.  Soft palate rises symmetrically bilaterally and uvula midline.      Neck: Neck supple, with moderate anterior lymphadenopathy that is soft and mobile to palpation. Thyroid non-palpable without tenderness or nodules. No supraclavicular lymphadenopathy.    Lungs:  Respiratory effort not labored without accessory muscle use.  Lungs with wheezes to auscultation bilaterally without   rales, or rhonchi.    Heart:  RRR, without murmurs rubs or gallops.  Radial and dorsalis pedis pulse 2+ bilaterally.  No LE edema.    Musculoskeletal:  Gait non-antalgic with a narrow base.    Derm:  Skin without rashes or lesions with good turgor pressure.      Psychiatric:  Mood, affect, and judgement appropriate.    5/18/2024 11:52 AM     HISTORY/REASON FOR EXAM:  Cough, congestion     TECHNIQUE/EXAM DESCRIPTION AND NUMBER OF VIEWS:  Two views of the chest.     COMPARISON:  None.     FINDINGS:     LUNGS: Clear. No effusions.  PNEUMOTHORAX: None.  LINES AND TUBES: None.  MEDIASTINUM: No cardiomegaly.  MUSCULOSKELETAL STRUCTURES: No acute displaced fracture.     IMPRESSION:     No acute cardiopulmonary abnormality.    Please note that this dictation was created using voice recognition software. I have made every reasonable attempt to correct obvious errors, but I expect that there are errors of grammar and possibly content that I did not discover  before finalizing the note.    Assessment/Plan:  1. COVID-19 virus infection  DX-CHEST-2 VIEWS    albuterol (Proventil) 2.5mg/3ml nebulizer solution 2.5 mg    predniSONE (DELTASONE) 20 MG Tab    albuterol (ACCUNEB) 0.63 MG/3ML nebulizer solution      2. Wheezing  DX-CHEST-2 VIEWS    albuterol (Proventil) 2.5mg/3ml nebulizer solution 2.5 mg    predniSONE (DELTASONE) 20 MG Tab    albuterol (ACCUNEB) 0.63 MG/3ML nebulizer solution        Patient presents with COVID and suspected asthma exacerbation.  Chest x-ray unremarkable.  Post nebulizer patient did have improvement in lung sounds with oxygen saturation 95 to 97% room air.  Prescribed albuterol nebulizer as well as a short prednisone taper.  Recommended continuing with pushing fluids and rest.  At this point Paxlovid unlikely to help given 5 to 7 days into infection.    Instructed to return to Urgent Care or nearest Emergency Department if symptoms fail to improve, for any change in condition, further concerns, or new concerning symptoms. Patient states understanding of the plan of care and discharge instructions.    Torsten Stokes PA-C

## 2025-05-12 ENCOUNTER — APPOINTMENT (OUTPATIENT)
Dept: ADMISSIONS | Facility: MEDICAL CENTER | Age: 65
End: 2025-05-12
Attending: ORTHOPAEDIC SURGERY
Payer: COMMERCIAL

## 2025-05-13 ENCOUNTER — APPOINTMENT (OUTPATIENT)
Dept: ADMISSIONS | Facility: MEDICAL CENTER | Age: 65
End: 2025-05-13
Attending: ORTHOPAEDIC SURGERY
Payer: COMMERCIAL

## 2025-05-13 VITALS — HEIGHT: 70 IN | BODY MASS INDEX: 21.52 KG/M2

## 2025-05-13 DIAGNOSIS — Z01.810 PRE-OPERATIVE CARDIOVASCULAR EXAMINATION: ICD-10-CM

## 2025-05-13 DIAGNOSIS — Z01.812 PRE-OPERATIVE LABORATORY EXAMINATION: ICD-10-CM

## 2025-05-13 LAB
ANION GAP SERPL CALC-SCNC: 13 MMOL/L (ref 7–16)
BUN SERPL-MCNC: 22 MG/DL (ref 8–22)
CALCIUM SERPL-MCNC: 9.3 MG/DL (ref 8.5–10.5)
CHLORIDE SERPL-SCNC: 108 MMOL/L (ref 96–112)
CO2 SERPL-SCNC: 22 MMOL/L (ref 20–33)
CREAT SERPL-MCNC: 1.08 MG/DL (ref 0.5–1.4)
EKG IMPRESSION: NORMAL
GFR SERPLBLD CREATININE-BSD FMLA CKD-EPI: 76 ML/MIN/1.73 M 2
GLUCOSE SERPL-MCNC: 107 MG/DL (ref 65–99)
POTASSIUM SERPL-SCNC: 3.9 MMOL/L (ref 3.6–5.5)
SODIUM SERPL-SCNC: 143 MMOL/L (ref 135–145)

## 2025-05-13 PROCEDURE — 80048 BASIC METABOLIC PNL TOTAL CA: CPT

## 2025-05-13 PROCEDURE — 93010 ELECTROCARDIOGRAM REPORT: CPT | Performed by: INTERNAL MEDICINE

## 2025-05-13 PROCEDURE — 36415 COLL VENOUS BLD VENIPUNCTURE: CPT

## 2025-05-13 PROCEDURE — 93005 ELECTROCARDIOGRAM TRACING: CPT | Mod: TC

## 2025-05-13 RX ORDER — FLUTICASONE PROPIONATE 50 MCG
1 SPRAY, SUSPENSION (ML) NASAL DAILY
COMMUNITY

## 2025-05-13 RX ORDER — PANTOPRAZOLE SODIUM 40 MG/1
40 TABLET, DELAYED RELEASE ORAL EVERY MORNING
COMMUNITY

## 2025-05-13 NOTE — PREADMIT AVS NOTE
Current Medications   Medication Instructions    pantoprazole (PROTONIX) 40 MG Tablet Delayed Response Continue taking medication as prescribed, including morning of procedure     fluticasone (FLONASE) 50 MCG/ACT nasal spray As needed medication, may take if needed, including morning of procedure     acetaminophen (TYLENOL) 500 MG Tab As needed medication, may take if needed, including morning of procedure     albuterol (ACCUNEB) 0.63 MG/3ML nebulizer solution As needed medication, may take if needed, including morning of procedure     Multiple Vitamin (MULTI-VITAMIN DAILY PO) Stop 7 days before surgery    VITAMIN D, CHOLECALCIFEROL, PO Stop 7 days before surgery    atorvastatin (LIPITOR) 10 MG Tab Continue taking as prescribed.    EPINEPHrine (EPIPEN) 0.3 MG/0.3ML Solution Auto-injector solution for injection As needed medication, may take if needed, including morning of procedure     losartan (COZAAR) 100 MG Tab Stop 24 hours before surgery    albuterol 108 (90 Base) MCG/ACT Aero Soln inhalation aerosol As needed medication, may take if needed, including morning of procedure     WIXELA INHUB 100-50 MCG/DOSE AEROSOL POWDER, BREATH ACTIVATED Continue taking as prescribed.    levothyroxine (SYNTHROID) 125 MCG Tab Continue taking medication as prescribed, including morning of procedure

## 2025-05-14 ENCOUNTER — HOSPITAL ENCOUNTER (OUTPATIENT)
Facility: MEDICAL CENTER | Age: 65
End: 2025-05-14
Attending: ORTHOPAEDIC SURGERY | Admitting: ORTHOPAEDIC SURGERY
Payer: COMMERCIAL

## 2025-05-14 ENCOUNTER — ANESTHESIA (OUTPATIENT)
Facility: MEDICAL CENTER | Age: 65
End: 2025-05-14
Payer: COMMERCIAL

## 2025-05-14 ENCOUNTER — ANESTHESIA EVENT (OUTPATIENT)
Facility: MEDICAL CENTER | Age: 65
End: 2025-05-14
Payer: COMMERCIAL

## 2025-05-14 VITALS
SYSTOLIC BLOOD PRESSURE: 185 MMHG | HEIGHT: 70 IN | HEART RATE: 56 BPM | BODY MASS INDEX: 23.55 KG/M2 | TEMPERATURE: 98.6 F | OXYGEN SATURATION: 96 % | WEIGHT: 164.46 LBS | DIASTOLIC BLOOD PRESSURE: 89 MMHG | RESPIRATION RATE: 14 BRPM

## 2025-05-14 DIAGNOSIS — G56.02 LEFT CARPAL TUNNEL SYNDROME: Primary | ICD-10-CM

## 2025-05-14 PROBLEM — I10 PRIMARY HYPERTENSION: Status: ACTIVE | Noted: 2025-05-14

## 2025-05-14 PROBLEM — E03.9 HYPOTHYROIDISM: Status: ACTIVE | Noted: 2025-05-14

## 2025-05-14 PROBLEM — J45.909 ASTHMA: Status: ACTIVE | Noted: 2025-05-14

## 2025-05-14 PROCEDURE — 160046 HCHG PACU - 1ST 60 MINS PHASE II: Performed by: ORTHOPAEDIC SURGERY

## 2025-05-14 PROCEDURE — A9270 NON-COVERED ITEM OR SERVICE: HCPCS | Performed by: ANESTHESIOLOGY

## 2025-05-14 PROCEDURE — 160025 RECOVERY II MINUTES (STATS): Performed by: ORTHOPAEDIC SURGERY

## 2025-05-14 PROCEDURE — 160048 HCHG OR STATISTICAL LEVEL 1-5: Performed by: ORTHOPAEDIC SURGERY

## 2025-05-14 PROCEDURE — 160009 HCHG ANES TIME/MIN: Performed by: ORTHOPAEDIC SURGERY

## 2025-05-14 PROCEDURE — 160015 HCHG STAT PREOP MINUTES: Performed by: ORTHOPAEDIC SURGERY

## 2025-05-14 PROCEDURE — 160029 HCHG SURGERY MINUTES - 1ST 30 MINS LEVEL 4: Performed by: ORTHOPAEDIC SURGERY

## 2025-05-14 PROCEDURE — 700101 HCHG RX REV CODE 250: Performed by: ORTHOPAEDIC SURGERY

## 2025-05-14 PROCEDURE — 700101 HCHG RX REV CODE 250: Performed by: ANESTHESIOLOGY

## 2025-05-14 PROCEDURE — 700105 HCHG RX REV CODE 258: Performed by: ORTHOPAEDIC SURGERY

## 2025-05-14 PROCEDURE — 700102 HCHG RX REV CODE 250 W/ 637 OVERRIDE(OP): Performed by: ANESTHESIOLOGY

## 2025-05-14 PROCEDURE — 700111 HCHG RX REV CODE 636 W/ 250 OVERRIDE (IP): Performed by: ANESTHESIOLOGY

## 2025-05-14 PROCEDURE — 160035 HCHG PACU - 1ST 60 MINS PHASE I: Performed by: ORTHOPAEDIC SURGERY

## 2025-05-14 PROCEDURE — 160002 HCHG RECOVERY MINUTES (STAT): Performed by: ORTHOPAEDIC SURGERY

## 2025-05-14 RX ORDER — CEFAZOLIN SODIUM 1 G/3ML
INJECTION, POWDER, FOR SOLUTION INTRAMUSCULAR; INTRAVENOUS PRN
Status: DISCONTINUED | OUTPATIENT
Start: 2025-05-14 | End: 2025-05-14 | Stop reason: SURG

## 2025-05-14 RX ORDER — LIDOCAINE HYDROCHLORIDE AND EPINEPHRINE 10; 10 MG/ML; UG/ML
INJECTION, SOLUTION INFILTRATION; PERINEURAL
Status: DISCONTINUED | OUTPATIENT
Start: 2025-05-14 | End: 2025-05-14 | Stop reason: HOSPADM

## 2025-05-14 RX ORDER — KETOROLAC TROMETHAMINE 15 MG/ML
INJECTION, SOLUTION INTRAMUSCULAR; INTRAVENOUS PRN
Status: DISCONTINUED | OUTPATIENT
Start: 2025-05-14 | End: 2025-05-14 | Stop reason: SURG

## 2025-05-14 RX ORDER — HYDRALAZINE HYDROCHLORIDE 20 MG/ML
5 INJECTION INTRAMUSCULAR; INTRAVENOUS
Status: DISCONTINUED | OUTPATIENT
Start: 2025-05-14 | End: 2025-05-14 | Stop reason: HOSPADM

## 2025-05-14 RX ORDER — BUPIVACAINE HYDROCHLORIDE AND EPINEPHRINE 5; 5 MG/ML; UG/ML
INJECTION, SOLUTION PERINEURAL
Status: DISCONTINUED | OUTPATIENT
Start: 2025-05-14 | End: 2025-05-14 | Stop reason: HOSPADM

## 2025-05-14 RX ORDER — ALBUTEROL SULFATE 5 MG/ML
2.5 SOLUTION RESPIRATORY (INHALATION)
Status: DISCONTINUED | OUTPATIENT
Start: 2025-05-14 | End: 2025-05-14 | Stop reason: HOSPADM

## 2025-05-14 RX ORDER — MEPERIDINE HYDROCHLORIDE 25 MG/ML
12.5 INJECTION INTRAMUSCULAR; INTRAVENOUS; SUBCUTANEOUS
Status: DISCONTINUED | OUTPATIENT
Start: 2025-05-14 | End: 2025-05-14 | Stop reason: HOSPADM

## 2025-05-14 RX ORDER — ONDANSETRON 2 MG/ML
4 INJECTION INTRAMUSCULAR; INTRAVENOUS
Status: DISCONTINUED | OUTPATIENT
Start: 2025-05-14 | End: 2025-05-14 | Stop reason: HOSPADM

## 2025-05-14 RX ORDER — OXYCODONE HCL 5 MG/5 ML
5 SOLUTION, ORAL ORAL
Status: DISCONTINUED | OUTPATIENT
Start: 2025-05-14 | End: 2025-05-14 | Stop reason: HOSPADM

## 2025-05-14 RX ORDER — SODIUM CHLORIDE, SODIUM LACTATE, POTASSIUM CHLORIDE, CALCIUM CHLORIDE 600; 310; 30; 20 MG/100ML; MG/100ML; MG/100ML; MG/100ML
INJECTION, SOLUTION INTRAVENOUS CONTINUOUS
Status: DISCONTINUED | OUTPATIENT
Start: 2025-05-14 | End: 2025-05-14 | Stop reason: HOSPADM

## 2025-05-14 RX ORDER — MIDAZOLAM HYDROCHLORIDE 1 MG/ML
INJECTION INTRAMUSCULAR; INTRAVENOUS PRN
Status: DISCONTINUED | OUTPATIENT
Start: 2025-05-14 | End: 2025-05-14 | Stop reason: SURG

## 2025-05-14 RX ORDER — LIDOCAINE HYDROCHLORIDE 20 MG/ML
INJECTION, SOLUTION EPIDURAL; INFILTRATION; INTRACAUDAL; PERINEURAL PRN
Status: DISCONTINUED | OUTPATIENT
Start: 2025-05-14 | End: 2025-05-14 | Stop reason: SURG

## 2025-05-14 RX ORDER — ACETAMINOPHEN 500 MG
1000 TABLET ORAL ONCE
Status: COMPLETED | OUTPATIENT
Start: 2025-05-14 | End: 2025-05-14

## 2025-05-14 RX ORDER — LABETALOL HYDROCHLORIDE 5 MG/ML
5 INJECTION, SOLUTION INTRAVENOUS
Status: DISCONTINUED | OUTPATIENT
Start: 2025-05-14 | End: 2025-05-14 | Stop reason: HOSPADM

## 2025-05-14 RX ADMIN — PROPOFOL 50 MG: 10 INJECTION, EMULSION INTRAVENOUS at 15:50

## 2025-05-14 RX ADMIN — SODIUM CHLORIDE, POTASSIUM CHLORIDE, SODIUM LACTATE AND CALCIUM CHLORIDE: 600; 310; 30; 20 INJECTION, SOLUTION INTRAVENOUS at 15:36

## 2025-05-14 RX ADMIN — CEFAZOLIN 2 G: 1 INJECTION, POWDER, FOR SOLUTION INTRAMUSCULAR; INTRAVENOUS at 15:37

## 2025-05-14 RX ADMIN — PROPOFOL 100 MG: 10 INJECTION, EMULSION INTRAVENOUS at 15:45

## 2025-05-14 RX ADMIN — LIDOCAINE HYDROCHLORIDE 80 MG: 20 INJECTION, SOLUTION EPIDURAL; INFILTRATION; INTRACAUDAL; PERINEURAL at 15:45

## 2025-05-14 RX ADMIN — MIDAZOLAM HYDROCHLORIDE 1 MG: 1 INJECTION, SOLUTION INTRAMUSCULAR; INTRAVENOUS at 15:37

## 2025-05-14 RX ADMIN — ACETAMINOPHEN 1000 MG: 500 TABLET ORAL at 13:37

## 2025-05-14 RX ADMIN — KETOROLAC TROMETHAMINE 15 MG: 15 INJECTION, SOLUTION INTRAMUSCULAR; INTRAVENOUS at 15:45

## 2025-05-14 ASSESSMENT — FIBROSIS 4 INDEX: FIB4 SCORE: 1.34

## 2025-05-14 ASSESSMENT — PAIN SCALES - GENERAL: PAIN_LEVEL: 0

## 2025-05-14 NOTE — ANESTHESIA POSTPROCEDURE EVALUATION
Patient: Luis Wilson    Procedure Summary       Date: 05/14/25 Room / Location: [place] [address] / SURGERY DAY SURGERY [facility]    Anesthesia Start: 1536 Anesthesia Stop: 1558    Procedure: LEFT ENDOSCOPIC CARPAL TUNNEL RELEASE (Left: Wrist) Diagnosis: (BILATERAL CARPAL TUNNEL SYNDROME)    Surgeons: Jorge Beltrán M.D. Responsible Provider: Jessy Azar M.D.    Anesthesia Type: MAC ASA Status: 2            Final Anesthesia Type: MAC  Last vitals  BP   121/76   Temp   37.1 °C (98.8 °F)    Pulse   70   Resp   16    SpO2   97%     Anesthesia Post Evaluation    Patient location during evaluation: PACU  Patient participation: complete - patient participated  Level of consciousness: awake  Pain score: 0    Airway patency: patent  Anesthetic complications: no  Cardiovascular status: hemodynamically stable  Respiratory status: acceptable  Hydration status: acceptable    PONV: none          No notable events documented.     Nurse Pain Score: 1 (NPRS)

## 2025-05-14 NOTE — ANESTHESIA TIME REPORT
Anesthesia Start and Stop Event Times       Date Time Event    5/14/2025 1427 Ready for Procedure     1536 Anesthesia Start     1558 Anesthesia Stop          Responsible Staff  05/14/25      Name Role Begin End    Jessy Azar M.D. Anesth 1536 1558          Overtime Reason:  overtime    Comments:

## 2025-05-14 NOTE — OR NURSING
1557 Pt arrived from OR post CTR, report received. Pt breathing spontaneous and unlabored. Dressing CDI.    1620 Pt wife updated on progress, POC and ETA for DC    1627 Pt states pain is controled and tolerable, denies nausea. Pt tolerating PO fluids. Pt to phase II. Pt dressing @ BS with assistance.    1650 Discharge instructions and medications gone over with Pt and ride. All questions answered. Pt meets DC criteria. PIV DC'd. Pt transported to POV via WC in stable condition.

## 2025-05-14 NOTE — DISCHARGE INSTRUCTIONS
Keep dressings clean, dry and intact   No lifting anything heavier than 10 lbs with the operative hand   May remove dressings after 48 hrs   May begin showering and washing hands as normal after 48 hrs   Do not soak the wound   Keep hand elevated and apply ice as much as possible in the first three days   Do not operate a vehicle or machinery while taking narcotic pain medications   Return to clinic in 10-14 days   Please call the office with any questions [phone] If any questions arise, call your provider.  If your provider is not available, please feel free to call the Surgical Center at [phone].    MEDICATIONS: Resume taking daily medication.  Take prescribed pain medication with food.  If no medication is prescribed, you may take non-aspirin pain medication if needed.  PAIN MEDICATION CAN BE VERY CONSTIPATING.  Take a stool softener or laxative such as senokot, pericolace, or milk of magnesia if needed.    Last pain medication given at NA    What to Expect Post Anesthesia    Rest and take it easy for the first 24 hours.  A responsible adult is recommended to remain with you during that time.  It is normal to feel sleepy.  We encourage you to not do anything that requires balance, judgment or coordination.    FOR 24 HOURS DO NOT:  Drive, operate machinery or run household appliances.  Drink beer or alcoholic beverages.  Make important decisions or sign legal documents.    To avoid nausea, slowly advance diet as tolerated, avoiding spicy or greasy foods for the first day.  Add more substantial food to your diet according to your provider's instructions.  Babies can be fed formula or breast milk as soon as they are hungry.  INCREASE FLUIDS AND FIBER TO AVOID CONSTIPATION.    MILD FLU-LIKE SYMPTOMS ARE NORMAL.  YOU MAY EXPERIENCE GENERALIZED MUSCLE ACHES, THROAT IRRITATION, HEADACHE AND/OR SOME NAUSEA.

## 2025-05-14 NOTE — ANESTHESIA PREPROCEDURE EVALUATION
Case: [id number] Date/Time: 05/14/25 1435    Procedure: LEFT ENDOSCOPIC CARPAL TUNNEL RELEASE    Pre-op diagnosis: BILATERAL CARPAL TUNNEL SYNDROME    Location: [facility] OR 10 / SURGERY DAY SURGERY [facility]    Surgeons: Jorge Beltrán M.D.          65 yo w/left CTS    Relevant Problems   ANESTHESIA (within normal limits)      PULMONARY   (positive) Asthma      CARDIAC   (positive) Primary hypertension      ENDO   (positive) Hypothyroidism       Physical Exam    Airway   Mallampati: I  TM distance: >3 FB  Neck ROM: full       Cardiovascular   Rhythm: regular  Rate: normal    (-) murmur     Dental - normal exam           Pulmonary Breath sounds clear to auscultation     Abdominal    Neurological - normal exam                   Anesthesia Plan    ASA 2       Plan - MAC               Induction: intravenous      Pertinent diagnostic labs and testing reviewed    Informed Consent:    Anesthetic plan and risks discussed with patient.    Use of blood products discussed with: patient whom consented to blood products.

## 2025-05-15 NOTE — OP REPORT
DATE OF SURGERY:  5/14/2025     PREOPERATIVE DIAGNOSIS:  Left carpal tunnel syndrome.     POSTOPERATIVE DIAGNOSIS:  Left carpal tunnel syndrome.     SURGERY PERFORMED:  Left endoscopic carpal tunnel release.     SURGEON:  Jorge Beltrán MD     ANESTHESIA:  Monitored anesthesia care with local.     ASSISTANT:  Victorina Aguila CFA     COMPLICATIONS:  None.     TOURNIQUET TIME:  4 min.     TOURNIQUET PRESSURE:  250 mmHg.    INDICATIONS FOR PROCEDURE:  Luis has had    persistent left carpal tunnel syndrome.  He has tried nonoperative    modalities including activity modifications, splints, NSAIDs, and these have    not resolved the symptoms.  After electrodiagnostic studies indicated left    carpal tunnel syndrome with evidence of compression at the wrist, the decision   was made to perform the operative room for the above-mentioned procedure.     DESCRIPTION OF PROCEDURE:  On the day of surgery, Luis was seen in the    preoperative area where informed consent was obtained with all risks and    benefits of the procedure explained and all questions answered.  He wished to   proceed with the surgery.  The proper site was marked. The patient was subsequently    taken to the operative room and placed in the supine position with all bony    prominences well padded.  A tourniquet was placed on the left upper    extremity.  It was then prepped and draped in the usual sterile fashion.     A timeout was performed with all persons and attendants agreeing on the proper   patient, proper surgical site, and proper surgery to be performed.     An esmarch was used to exsanguinate the left upper extremity and tourniquet    was insufflated to 250 mmHg.  Monitored anesthesia care was induced and 4 mL    of equal parts of 1% lidocaine with epinephrine and 0.5% bupivacaine with    epinephrine were injected just proximal to the wrist crease.  A transverse    incision was made at the distal wrist crease from the ulnar border of the     palmaris longus ulnarly.  This was roughly 1 cm in length.  Sharp and blunt    dissection was taken down to the level of the forearm fascia.  Skin hooks were   then placed proximally and distally to help retract soft tissues.  The    forearm fascia was visualized and this was incised with a [place] blade.  The    forearm fascia was then retracted distally with a double skin hook.  The    Spatula provided in the [employer] Centerline Endoscope tray was then placed deep to the   transverse carpal ligament and the canal was prepped for the endoscope by placing    increasingly larger trocars x2.  Next, the endoscope was placed deep to the    transverse carpal ligament, which was visualized clearly.  The distal aspect    of the transverse carpal ligament was then incised and this was visualized to    ensure complete transection distally.  Once this was ensured, the blade was    then maintained elevated and the endoscope was brought proximally across the  transverse carpal ligament.  The entirety of the transverse carpal ligament was incised and the endoscope was removed.     The wound was irrigated with normal saline and closed with 4-0 nylon in a    horizontal mattress fashion.  The wound was then dressed with Xeroform, 4x4 gauze and a tegaderm dressing.  The tourniquet was deflated.  The patient    was subsequently taken to the PACU in good and stable condition.     DISPOSITION:  Plan for discharge to home on a regular diet.  Weightbearing  as tolerated to the operative extremity with the exception of a 10-pound    lifting restriction.  Bandages may be removed after 48 hours.  At that time may   begin showering and bathing as normal, but should not soak the wound.    Return to clinic in 10-14 days.  A prescription for tramadol 50 mg 1 tab  PO q4-6 hours p.r.n. pain.

## 2025-05-28 ENCOUNTER — HOSPITAL ENCOUNTER (OUTPATIENT)
Facility: MEDICAL CENTER | Age: 65
End: 2025-05-28
Attending: ORTHOPAEDIC SURGERY | Admitting: ORTHOPAEDIC SURGERY
Payer: COMMERCIAL

## 2025-05-28 ENCOUNTER — ANESTHESIA EVENT (OUTPATIENT)
Facility: MEDICAL CENTER | Age: 65
End: 2025-05-28
Payer: COMMERCIAL

## 2025-05-28 ENCOUNTER — ANESTHESIA (OUTPATIENT)
Facility: MEDICAL CENTER | Age: 65
End: 2025-05-28
Payer: COMMERCIAL

## 2025-05-28 VITALS
BODY MASS INDEX: 23.67 KG/M2 | TEMPERATURE: 97.5 F | HEIGHT: 70 IN | OXYGEN SATURATION: 97 % | RESPIRATION RATE: 14 BRPM | DIASTOLIC BLOOD PRESSURE: 89 MMHG | WEIGHT: 165.34 LBS | HEART RATE: 58 BPM | SYSTOLIC BLOOD PRESSURE: 168 MMHG

## 2025-05-28 DIAGNOSIS — G56.01 RIGHT CARPAL TUNNEL SYNDROME: Primary | ICD-10-CM

## 2025-05-28 PROCEDURE — 160035 HCHG PACU - 1ST 60 MINS PHASE I: Performed by: ORTHOPAEDIC SURGERY

## 2025-05-28 PROCEDURE — A9270 NON-COVERED ITEM OR SERVICE: HCPCS | Performed by: ANESTHESIOLOGY

## 2025-05-28 PROCEDURE — 160025 RECOVERY II MINUTES (STATS): Performed by: ORTHOPAEDIC SURGERY

## 2025-05-28 PROCEDURE — 700101 HCHG RX REV CODE 250: Performed by: ANESTHESIOLOGY

## 2025-05-28 PROCEDURE — 160046 HCHG PACU - 1ST 60 MINS PHASE II: Performed by: ORTHOPAEDIC SURGERY

## 2025-05-28 PROCEDURE — 160015 HCHG STAT PREOP MINUTES: Performed by: ORTHOPAEDIC SURGERY

## 2025-05-28 PROCEDURE — 700111 HCHG RX REV CODE 636 W/ 250 OVERRIDE (IP): Mod: JZ | Performed by: ANESTHESIOLOGY

## 2025-05-28 PROCEDURE — 160002 HCHG RECOVERY MINUTES (STAT): Performed by: ORTHOPAEDIC SURGERY

## 2025-05-28 PROCEDURE — 700102 HCHG RX REV CODE 250 W/ 637 OVERRIDE(OP): Performed by: ANESTHESIOLOGY

## 2025-05-28 PROCEDURE — 700101 HCHG RX REV CODE 250: Performed by: ORTHOPAEDIC SURGERY

## 2025-05-28 PROCEDURE — 160009 HCHG ANES TIME/MIN: Performed by: ORTHOPAEDIC SURGERY

## 2025-05-28 PROCEDURE — 700105 HCHG RX REV CODE 258: Performed by: ORTHOPAEDIC SURGERY

## 2025-05-28 PROCEDURE — 160029 HCHG SURGERY MINUTES - 1ST 30 MINS LEVEL 4: Performed by: ORTHOPAEDIC SURGERY

## 2025-05-28 PROCEDURE — 160048 HCHG OR STATISTICAL LEVEL 1-5: Performed by: ORTHOPAEDIC SURGERY

## 2025-05-28 RX ORDER — ALBUTEROL SULFATE 5 MG/ML
2.5 SOLUTION RESPIRATORY (INHALATION)
Status: DISCONTINUED | OUTPATIENT
Start: 2025-05-28 | End: 2025-05-28 | Stop reason: HOSPADM

## 2025-05-28 RX ORDER — LABETALOL HYDROCHLORIDE 5 MG/ML
5 INJECTION, SOLUTION INTRAVENOUS
Status: DISCONTINUED | OUTPATIENT
Start: 2025-05-28 | End: 2025-05-28 | Stop reason: HOSPADM

## 2025-05-28 RX ORDER — EPHEDRINE SULFATE 50 MG/ML
5 INJECTION, SOLUTION INTRAVENOUS
Status: DISCONTINUED | OUTPATIENT
Start: 2025-05-28 | End: 2025-05-28 | Stop reason: HOSPADM

## 2025-05-28 RX ORDER — AMLODIPINE BESYLATE 5 MG/1
5 TABLET ORAL DAILY
COMMUNITY

## 2025-05-28 RX ORDER — DIPHENHYDRAMINE HYDROCHLORIDE 50 MG/ML
12.5 INJECTION, SOLUTION INTRAMUSCULAR; INTRAVENOUS
Status: DISCONTINUED | OUTPATIENT
Start: 2025-05-28 | End: 2025-05-28 | Stop reason: HOSPADM

## 2025-05-28 RX ORDER — SODIUM BICARBONATE 42 MG/ML
INJECTION, SOLUTION INTRAVENOUS
Status: DISCONTINUED | OUTPATIENT
Start: 2025-05-28 | End: 2025-05-28 | Stop reason: HOSPADM

## 2025-05-28 RX ORDER — ACETAMINOPHEN 500 MG
1000 TABLET ORAL ONCE
Status: COMPLETED | OUTPATIENT
Start: 2025-05-28 | End: 2025-05-28

## 2025-05-28 RX ORDER — OXYCODONE HCL 5 MG/5 ML
10 SOLUTION, ORAL ORAL
Status: DISCONTINUED | OUTPATIENT
Start: 2025-05-28 | End: 2025-05-28 | Stop reason: HOSPADM

## 2025-05-28 RX ORDER — SODIUM CHLORIDE, SODIUM LACTATE, POTASSIUM CHLORIDE, CALCIUM CHLORIDE 600; 310; 30; 20 MG/100ML; MG/100ML; MG/100ML; MG/100ML
INJECTION, SOLUTION INTRAVENOUS CONTINUOUS
Status: ACTIVE | OUTPATIENT
Start: 2025-05-28 | End: 2025-05-28

## 2025-05-28 RX ORDER — KETOROLAC TROMETHAMINE 15 MG/ML
INJECTION, SOLUTION INTRAMUSCULAR; INTRAVENOUS PRN
Status: DISCONTINUED | OUTPATIENT
Start: 2025-05-28 | End: 2025-05-28 | Stop reason: SURG

## 2025-05-28 RX ORDER — CEFAZOLIN SODIUM 1 G/3ML
INJECTION, POWDER, FOR SOLUTION INTRAMUSCULAR; INTRAVENOUS PRN
Status: DISCONTINUED | OUTPATIENT
Start: 2025-05-28 | End: 2025-05-28 | Stop reason: SURG

## 2025-05-28 RX ORDER — SODIUM CHLORIDE, SODIUM LACTATE, POTASSIUM CHLORIDE, CALCIUM CHLORIDE 600; 310; 30; 20 MG/100ML; MG/100ML; MG/100ML; MG/100ML
INJECTION, SOLUTION INTRAVENOUS CONTINUOUS
Status: DISCONTINUED | OUTPATIENT
Start: 2025-05-28 | End: 2025-05-28 | Stop reason: HOSPADM

## 2025-05-28 RX ORDER — ONDANSETRON 2 MG/ML
4 INJECTION INTRAMUSCULAR; INTRAVENOUS
Status: DISCONTINUED | OUTPATIENT
Start: 2025-05-28 | End: 2025-05-28 | Stop reason: HOSPADM

## 2025-05-28 RX ORDER — HYDRALAZINE HYDROCHLORIDE 20 MG/ML
5 INJECTION INTRAMUSCULAR; INTRAVENOUS
Status: DISCONTINUED | OUTPATIENT
Start: 2025-05-28 | End: 2025-05-28 | Stop reason: HOSPADM

## 2025-05-28 RX ORDER — LIDOCAINE HYDROCHLORIDE AND EPINEPHRINE 10; 10 MG/ML; UG/ML
INJECTION, SOLUTION INFILTRATION; PERINEURAL
Status: DISCONTINUED | OUTPATIENT
Start: 2025-05-28 | End: 2025-05-28 | Stop reason: HOSPADM

## 2025-05-28 RX ORDER — OXYCODONE HCL 5 MG/5 ML
5 SOLUTION, ORAL ORAL
Status: DISCONTINUED | OUTPATIENT
Start: 2025-05-28 | End: 2025-05-28 | Stop reason: HOSPADM

## 2025-05-28 RX ORDER — HALOPERIDOL 5 MG/ML
1 INJECTION INTRAMUSCULAR
Status: DISCONTINUED | OUTPATIENT
Start: 2025-05-28 | End: 2025-05-28 | Stop reason: HOSPADM

## 2025-05-28 RX ORDER — BUPIVACAINE HCL/EPINEPHRINE 0.5-1:200K
VIAL (ML) INJECTION
Status: DISCONTINUED | OUTPATIENT
Start: 2025-05-28 | End: 2025-05-28 | Stop reason: HOSPADM

## 2025-05-28 RX ORDER — LIDOCAINE HYDROCHLORIDE 20 MG/ML
INJECTION, SOLUTION EPIDURAL; INFILTRATION; INTRACAUDAL; PERINEURAL PRN
Status: DISCONTINUED | OUTPATIENT
Start: 2025-05-28 | End: 2025-05-28 | Stop reason: SURG

## 2025-05-28 RX ADMIN — LIDOCAINE HYDROCHLORIDE 100 MG: 20 INJECTION, SOLUTION EPIDURAL; INFILTRATION; INTRACAUDAL; PERINEURAL at 15:16

## 2025-05-28 RX ADMIN — PROPOFOL 50 MG: 10 INJECTION, EMULSION INTRAVENOUS at 15:18

## 2025-05-28 RX ADMIN — ACETAMINOPHEN 1000 MG: 500 TABLET ORAL at 14:33

## 2025-05-28 RX ADMIN — KETOROLAC TROMETHAMINE 15 MG: 15 INJECTION, SOLUTION INTRAMUSCULAR; INTRAVENOUS at 15:16

## 2025-05-28 RX ADMIN — CEFAZOLIN 2 G: 1 INJECTION, POWDER, FOR SOLUTION INTRAMUSCULAR; INTRAVENOUS at 15:13

## 2025-05-28 RX ADMIN — SODIUM CHLORIDE, POTASSIUM CHLORIDE, SODIUM LACTATE AND CALCIUM CHLORIDE: 600; 310; 30; 20 INJECTION, SOLUTION INTRAVENOUS at 14:42

## 2025-05-28 RX ADMIN — PROPOFOL 50 MG: 10 INJECTION, EMULSION INTRAVENOUS at 15:20

## 2025-05-28 RX ADMIN — PROPOFOL 50 MG: 10 INJECTION, EMULSION INTRAVENOUS at 15:16

## 2025-05-28 RX ADMIN — PROPOFOL 50 MG: 10 INJECTION, EMULSION INTRAVENOUS at 15:21

## 2025-05-28 ASSESSMENT — PAIN DESCRIPTION - PAIN TYPE
TYPE: SURGICAL PAIN

## 2025-05-28 ASSESSMENT — FIBROSIS 4 INDEX: FIB4 SCORE: 1.34

## 2025-05-28 NOTE — ANESTHESIA POSTPROCEDURE EVALUATION
Patient: Luis iWlson    Procedure Summary       Date: 05/28/25 Room / Location: [facility] OR 09 / SURGERY DAY SURGERY [facility]    Anesthesia Start: 1513 Anesthesia Stop: 1535    Procedure: RIGHT ENDOSCOPIC CARPAL TUNNEL RELEASE (Right: Wrist) Diagnosis: (BILATERAL CARPAL TUNNEL SYNDROME)    Surgeons: Jorge Beltrán M.D. Responsible Provider: Kamaljit Barr M.D.    Anesthesia Type: general, MAC ASA Status: 2            Final Anesthesia Type: general, MAC  Last vitals  BP   Blood Pressure: (!) 154/88    Temp   36.4 °C (97.5 °F)    Pulse   (!) 58   Resp   14    SpO2   97 %      Anesthesia Post Evaluation    Patient location during evaluation: PACU  Patient participation: complete - patient participated  Level of consciousness: awake and alert    Airway patency: patent  Anesthetic complications: no  Cardiovascular status: hemodynamically stable  Respiratory status: acceptable  Hydration status: euvolemic    PONV: none          No notable events documented.     Nurse Pain Score: 0 (NPRS)

## 2025-05-28 NOTE — OR NURSING
Procedure, patient allergies, and NPO status verified. Home med rec reviewed with pt--medications, dosages, and last time taken correct per pt. Belongings secured. Patient verbalizes understanding of pain scale, expected course of stay, and plan of care. Surgical site verified with pt, IV access established, and SCD placed on BLE.      Healing incision from 2 weeks ago to left wrist, covered with a bandaid-CDI.

## 2025-05-28 NOTE — ANESTHESIA PREPROCEDURE EVALUATION
Case: [id number] Date/Time: 05/28/25 1250    Procedure: RIGHT ENDOSCOPIC CARPAL TUNNEL RELEASE (Right: Wrist)    Anesthesia type: MAC    Pre-op diagnosis: BILATERAL CARPAL TUNNEL SYNDROME    Location: [facility] OR 09 / SURGERY DAY SURGERY [facility]    Surgeons: Jorge Beltrán M.D.            Relevant Problems   PULMONARY   (positive) Asthma      CARDIAC   (positive) Primary hypertension      ENDO   (positive) Hypothyroidism      Other   (positive) Left carpal tunnel syndrome       Physical Exam    Airway   Mallampati: II  TM distance: >3 FB  Neck ROM: full       Cardiovascular - normal exam  Rhythm: regular  Rate: normal    (-) murmur     Dental - normal exam           Pulmonary - normal examBreath sounds clear to auscultation     Abdominal    Neurological - normal exam                   Anesthesia Plan    ASA 2       Plan - general and MAC       Airway plan will be natural airway          Induction: intravenous    Postoperative Plan: Postoperative administration of opioids is intended.    Pertinent diagnostic labs and testing reviewed    Informed Consent:    Anesthetic plan and risks discussed with patient.    Use of blood products discussed with: patient whom consented to blood products.

## 2025-05-28 NOTE — OP REPORT
DATE OF SURGERY:  5/28/2025     PREOPERATIVE DIAGNOSIS:  Right carpal tunnel syndrome.     POSTOPERATIVE DIAGNOSIS:  Right carpal tunnel syndrome.     SURGERY PERFORMED:  Right endoscopic carpal tunnel release.     SURGEON:  Jorge Beltrán MD     ANESTHESIA:  Monitored anesthesia care with local.     ASSISTANT:  Victorina Aguila CFA     COMPLICATIONS:  None.     TOURNIQUET TIME:  3 min.     TOURNIQUET PRESSURE:  250 mmHg.    INDICATIONS FOR PROCEDURE:  Luis has had    persistent right carpal tunnel syndrome.  He has tried nonoperative    modalities including activity modifications, splints, NSAIDs, and these have    not resolved the symptoms.  After electrodiagnostic studies indicated right    carpal tunnel syndrome with evidence of compression at the wrist, the decision   was made to perform the operative room for the above-mentioned procedure.     DESCRIPTION OF PROCEDURE:  On the day of surgery, Luis was seen in the    preoperative area where informed consent was obtained with all risks and    benefits of the procedure explained and all questions answered.  He wished to   proceed with the surgery.  The proper site was marked. The patient was subsequently    taken to the operative room and placed in the supine position with all bony    prominences well padded.  A tourniquet was placed on the right upper    extremity.  It was then prepped and draped in the usual sterile fashion.     A timeout was performed with all persons and attendants agreeing on the proper   patient, proper surgical site, and proper surgery to be performed.     An esmarch was used to exsanguinate the right upper extremity and tourniquet    was insufflated to 250 mmHg.  Monitored anesthesia care was induced and 4 mL    of equal parts of 1% lidocaine with epinephrine and 0.5% bupivacaine with    epinephrine were injected just proximal to the wrist crease.  A transverse    incision was made at the distal wrist crease from the ulnar border  of the    palmaris longus ulnarly.  This was roughly 1 cm in length.  Sharp and blunt    dissection was taken down to the level of the forearm fascia.  Skin hooks were   then placed proximally and distally to help retract soft tissues.  The    forearm fascia was visualized and this was incised with a [place] blade.  The    forearm fascia was then retracted distally with a double skin hook.  The    Spatula provided in the [employer] Centerline Endoscope tray was then placed deep to the   transverse carpal ligament and the canal was prepped for the endoscope by placing    increasingly larger trocars x2.  Next, the endoscope was placed deep to the    transverse carpal ligament, which was visualized clearly.  The distal aspect    of the transverse carpal ligament was then incised and this was visualized to    ensure complete transection distally.  Once this was ensured, the blade was    then maintained elevated and the endoscope was brought proximally across the  transverse carpal ligament.  The entirety of the transverse carpal ligament was incised and the endoscope was removed.     The wound was irrigated with normal saline and closed with 4-0 nylon in a    horizontal mattress fashion.  The wound was then dressed with Xeroform, 4x4 gauze and a tegaderm dressing.  The tourniquet was deflated.  The patient    was subsequently taken to the PACU in good and stable condition.     DISPOSITION:  Plan for discharge to home on a regular diet.  Weightbearing  as tolerated to the operative extremity with the exception of a 10-pound    lifting restriction.  Bandages may be removed after 48 hours.  At that time may   begin showering and bathing as normal, but should not soak the wound.    Return to clinic in 10-14 days.  A prescription for tramadol 50 mg 1 tab  PO q4-6 hours p.r.n. pain.

## 2025-05-28 NOTE — ANESTHESIA TIME REPORT
Anesthesia Start and Stop Event Times       Date Time Event    5/28/2025 1453 Ready for Procedure     1513 Anesthesia Start     1535 Anesthesia Stop          Responsible Staff  05/28/25      Name Role Begin End    Kamaljit Barr M.D. Anesth 1513 1535          Overtime Reason:  no overtime (within assigned shift)    Comments:

## 2025-05-28 NOTE — OR NURSING
1533: Patient arrived to PACU from OR via gurney. Report received from anesthesia and RN. Cold pack applied.     1554: family updated, Wife Chichi.     1615: pt meets criteria for transfer to stage II. RN continuing care and getting dressed with RN assistance.     1620:Family at bedside, Wife Chichi    1626: Patient education completed, family denies further questions.DC'd to care of family post uneventful stay in PACU 2. IV discontinued.     1630 :Pt taken out via wc and placed into care of family.

## 2025-05-28 NOTE — DISCHARGE INSTRUCTIONS
ACTIVITY: Rest and take it easy for the first 24 hours.  A responsible adult is recommended to remain with you during that time.  It is normal to feel sleepy.  We encourage you to not do anything that requires balance, judgment or coordination.    MILD FLU-LIKE SYMPTOMS ARE NORMAL. YOU MAY EXPERIENCE GENERALIZED MUSCLE ACHES, THROAT IRRITATION, HEADACHE AND/OR SOME NAUSEA.    FOR 24 HOURS DO NOT:  Drive, operate machinery or run household appliances.  Drink beer or alcoholic beverages.   Make important decisions or sign legal documents.    SPECIAL INSTRUCTIONS:     Keep dressings clean, dry and intact   No lifting anything heavier than 10 lbs with the operative hand   May remove dressings after 48 hrs   May begin showering and washing hands as normal after 48 hrs   Do not soak the wound   Keep hand elevated and apply ice as much as possible in the first three days   Do not operate a vehicle or machinery while taking narcotic pain medications   Return to clinic in 10-14 days   Please call the office with any questions [phone]     DIET: To avoid nausea, slowly advance diet as tolerated, avoiding spicy or greasy foods for the first day.  Add more substantial food to your diet according to your physician's instructions.  INCREASE FLUIDS AND FIBER TO AVOID CONSTIPATION.    You should CALL YOUR PHYSICIAN if you develop:  Fever greater than 101 degrees F.  Pain not relieved by medication, or persistent nausea or vomiting.  Excessive bleeding (blood soaking through dressing) or unexpected drainage from the wound.  Extreme redness or swelling around the incision site, drainage of pus or foul smelling drainage.  Inability to urinate or empty your bladder within 8 hours.  Problems with breathing or chest pain.    You should call 911 if you develop problems with breathing or chest pain.  If you are unable to contact your doctor or surgical center, you should go to the nearest emergency room or urgent care center.   Physician's telephone #: [phone]     If any questions arise, call your doctor.  If your doctor is not available, please feel free to call the Surgical Center at [phone].     A registered nurse may call you a few days after your surgery to see how you are doing after your procedure.    MEDICATIONS: Resume taking daily medication.  Take prescribed pain medication with food.  If no medication is prescribed, you may take non-aspirin pain medication if needed.  PAIN MEDICATION CAN BE VERY CONSTIPATING.  Take a stool softener or laxative such as senokot, pericolace, or milk of magnesia if needed.     Last pain medication given at 2:33 1000mg Tylenol .    If your physician has prescribed pain medication that includes Acetaminophen (Tylenol), do not take additional Acetaminophen (Tylenol) while taking the prescribed medication.

## (undated) DEVICE — ELECTRODE DUAL RETURN W/ CORD - (50/PK)

## (undated) DEVICE — SYSTEM CLEARIFY VISUALIZATION (10EA/PK)

## (undated) DEVICE — CLIP HEM-O-LOC GREEN - (14EA/BX)

## (undated) DEVICE — CONTAINER, SPECIMEN, STERILE

## (undated) DEVICE — SUTURE 2-0 SILK SH (36PK/BX)

## (undated) DEVICE — ADHESIVE MASTISOL - (48/BX)

## (undated) DEVICE — TUBING INSUFFLATION - (10/BX)

## (undated) DEVICE — TUBE CONNECTING SUCTION - CLEAR PLASTIC STERILE 72 IN (50EA/CA)

## (undated) DEVICE — AIRLESS LAP SPRAY TIP VISTASEAL (3EA/BX)

## (undated) DEVICE — TROCAR Z THREAD12MM OPTICAL - NON BLADED (6/BX)

## (undated) DEVICE — SUTURE 2-0 STRATAFIX SPIRAL PDS SH (12EA/BX)

## (undated) DEVICE — COVER LIGHT HANDLE FLEXIBLE - SOFT (2EA/PK 80PK/CA)

## (undated) DEVICE — SUTURE 2-0 VICRYL PLUS SH - 27 INCH (36/BX)

## (undated) DEVICE — LACTATED RINGERS INJ 1000 ML - (14EA/CA 60CA/PF)

## (undated) DEVICE — TROCAR 5X100 NON BLADED Z-TH - READ KII (6/BX)

## (undated) DEVICE — TOWEL STOP TIMEOUT SAFETY FLAG (40EA/CA)

## (undated) DEVICE — SPONGE GAUZESTER. 2X2 4-PL - (2/PK 50PK/BX 30BX/CS)

## (undated) DEVICE — CLIP HEMOLOCK PURPLE - (14/BX)

## (undated) DEVICE — GLOVE BIOGEL SZ 8 SURGICAL PF LTX - (50PR/BX 4BX/CA)

## (undated) DEVICE — SODIUM CHL IRRIGATION 0.9% 1000ML (12EA/CA)

## (undated) DEVICE — SUTURE 0 VICRYL PLUS CT-1 - 8 X 18 INCH (12/BX)

## (undated) DEVICE — GLOVE BIOGEL INDICATOR SZ 8 SURGICAL PF LTX - (50/BX 4BX/CA)

## (undated) DEVICE — BLADE BEAVER 6400 MINI EYE ROUND TIP SHARP ON ONE SIDE (20/CA)

## (undated) DEVICE — SUTURE 3-0 VICRYL PLUS RB-1 - (36/BX)

## (undated) DEVICE — COVER TIP ENDOWRIST HOT SHEAR - (10EA/BX) DA VINCI

## (undated) DEVICE — FORCEPS PROGRASP (18UN/EA)

## (undated) DEVICE — SEAL 5MM-8MM UNIVERSAL  BOX OF 10

## (undated) DEVICE — SUTURE 4-0 MONOCRYL PLUS PS-2 - 27 INCH (36/BX)

## (undated) DEVICE — SLEEVE VASO CALF MED - (10PR/CA)

## (undated) DEVICE — SET LEADWIRE 5 LEAD BEDSIDE DISPOSABLE ECG (1SET OF 5/EA)

## (undated) DEVICE — INSTRUMENT ENDOSCOPIC CENTERLINE RELEASE

## (undated) DEVICE — DRAPE ARM  BOX OF 20

## (undated) DEVICE — CHLORAPREP 26 ML APPLICATOR - ORANGE TINT(25/CA)

## (undated) DEVICE — OBTURATOR BLADELESS STANDARD 8MM (6EA/BX)

## (undated) DEVICE — PAD OR TABLE DA VINCI 2IN X 20IN X 72IN - (12EA/CA)

## (undated) DEVICE — SUTURE GENERAL

## (undated) DEVICE — SUTURE 4-0 MONOCRYL PLUS PS-1 - 27 INCH (36/BX)

## (undated) DEVICE — ARMREST CRADLE FOAM - (2PR/PK 12PR/CA)

## (undated) DEVICE — DRAPE COLUMN  BOX OF 20

## (undated) DEVICE — BAG RETRIEVAL 10ML (10EA/BX)

## (undated) DEVICE — CATHETER URETHRAL FOLEY SILICONE OD16 FR 10 ML (10EA/CA)

## (undated) DEVICE — PACK UPPER EXTREMITY SM OR - (3/CA)

## (undated) DEVICE — SUTURE 2-0 20CM STRATAFIX SPIRAL SH NEEDLE (12/BX)

## (undated) DEVICE — DRESSING TRANSPARENT FILM TEGADERM 2.375 X 2.75" (100EA/BX)"

## (undated) DEVICE — PAD PREP 24 X 48 CUFFED - (100/CA)

## (undated) DEVICE — PACK DAVINCI PROSTATECTOMY - (1EA/CA)

## (undated) DEVICE — SUTURE 4-0 ETHILON PS-2 18 (12PK/BX)"

## (undated) DEVICE — ROBOTIC SURGERY SERVICES

## (undated) DEVICE — GOWN WARMING STANDARD FLEX - (30/CA)

## (undated) DEVICE — SHEARS MONOPOLAR CURVED  DA VINCI 10X'S REUSABLE

## (undated) DEVICE — GLOVE BIOGEL SZ 7.5 SURGICAL PF LTX - (50PR/BX 4BX/CA)

## (undated) DEVICE — SUTURE 3-0 15CM STRATAFIX SPIRAL RB-1 (12EA/BX)

## (undated) DEVICE — SCISSORS 5MM CVD (6EA/BX)

## (undated) DEVICE — NEEDLE INSFL 120MM 14GA VRRS - (20/BX)

## (undated) DEVICE — DRESSING SURGICAL NUKNIT 6X9 (10EA/BX)

## (undated) DEVICE — DEVICE CLOSURE KIT VISTASEAL 4ML (1EA/BX)

## (undated) DEVICE — SUTURE 0 VICRYL PLUS CT-1 - 36 INCH (36/BX)

## (undated) DEVICE — PACK TRENGUARD 450 PROCEDURE (12EA/CA)

## (undated) DEVICE — SLEEVE, VASO, THIGH, MED